# Patient Record
Sex: MALE | Race: WHITE | NOT HISPANIC OR LATINO
[De-identification: names, ages, dates, MRNs, and addresses within clinical notes are randomized per-mention and may not be internally consistent; named-entity substitution may affect disease eponyms.]

---

## 2017-04-18 ENCOUNTER — APPOINTMENT (OUTPATIENT)
Dept: DERMATOLOGY | Facility: CLINIC | Age: 62
End: 2017-04-18

## 2017-04-18 DIAGNOSIS — Z86.69 PERSONAL HISTORY OF OTHER DISEASES OF THE NERVOUS SYSTEM AND SENSE ORGANS: ICD-10-CM

## 2017-11-15 ENCOUNTER — APPOINTMENT (OUTPATIENT)
Dept: DERMATOLOGY | Facility: CLINIC | Age: 62
End: 2017-11-15
Payer: COMMERCIAL

## 2017-11-15 VITALS — WEIGHT: 200 LBS | HEIGHT: 72 IN | BODY MASS INDEX: 27.09 KG/M2

## 2017-11-15 DIAGNOSIS — L60.8 OTHER NAIL DISORDERS: ICD-10-CM

## 2017-11-15 PROCEDURE — 99213 OFFICE O/P EST LOW 20 MIN: CPT

## 2018-02-15 ENCOUNTER — APPOINTMENT (OUTPATIENT)
Dept: INTERNAL MEDICINE | Facility: CLINIC | Age: 63
End: 2018-02-15
Payer: COMMERCIAL

## 2018-02-15 VITALS
HEIGHT: 72 IN | TEMPERATURE: 97.5 F | BODY MASS INDEX: 28.58 KG/M2 | DIASTOLIC BLOOD PRESSURE: 70 MMHG | RESPIRATION RATE: 16 BRPM | OXYGEN SATURATION: 97 % | WEIGHT: 211 LBS | HEART RATE: 83 BPM | SYSTOLIC BLOOD PRESSURE: 134 MMHG

## 2018-02-15 DIAGNOSIS — H40.9 UNSPECIFIED GLAUCOMA: ICD-10-CM

## 2018-02-15 DIAGNOSIS — E78.1 PURE HYPERGLYCERIDEMIA: ICD-10-CM

## 2018-02-15 DIAGNOSIS — F41.9 ANXIETY DISORDER, UNSPECIFIED: ICD-10-CM

## 2018-02-15 DIAGNOSIS — E78.00 PURE HYPERCHOLESTEROLEMIA, UNSPECIFIED: ICD-10-CM

## 2018-02-15 PROCEDURE — ZZZZZ: CPT

## 2018-02-15 PROCEDURE — 94060 EVALUATION OF WHEEZING: CPT

## 2018-02-15 PROCEDURE — 94727 GAS DIL/WSHOT DETER LNG VOL: CPT

## 2018-02-15 PROCEDURE — 94729 DIFFUSING CAPACITY: CPT

## 2018-02-15 PROCEDURE — 94664 DEMO&/EVAL PT USE INHALER: CPT | Mod: 59

## 2018-02-15 PROCEDURE — 99245 OFF/OP CONSLTJ NEW/EST HI 55: CPT | Mod: 25

## 2018-02-15 RX ORDER — PILOCARPINE HYDROCHLORIDE OPHTHALMIC SOLUTION 20 MG/ML
2 SOLUTION/ DROPS OPHTHALMIC
Qty: 15 | Refills: 0 | Status: ACTIVE | COMMUNITY
Start: 2017-06-08

## 2018-02-15 RX ORDER — BRIMONIDINE TARTRATE, TIMOLOL MALEATE 2; 5 MG/ML; MG/ML
0.2-0.5 SOLUTION/ DROPS OPHTHALMIC
Qty: 10 | Refills: 0 | Status: ACTIVE | COMMUNITY
Start: 2017-07-07

## 2018-02-15 RX ORDER — FENOFIBRATE 54 MG/1
54 TABLET ORAL DAILY
Refills: 0 | Status: ACTIVE | COMMUNITY
Start: 2017-07-08

## 2018-02-15 RX ORDER — BRINZOLAMIDE 10 MG/ML
1 SUSPENSION/ DROPS OPHTHALMIC
Qty: 10 | Refills: 0 | Status: ACTIVE | COMMUNITY
Start: 2017-06-07

## 2018-02-15 RX ORDER — ROSUVASTATIN CALCIUM 5 MG/1
5 TABLET, FILM COATED ORAL DAILY
Refills: 0 | Status: ACTIVE | COMMUNITY

## 2018-02-15 RX ORDER — NEOMYCIN SULFATE, POLYMYXIN B SULFATE AND DEXAMETHASONE 3.5; 10000; 1 MG/ML; [USP'U]/ML; MG/ML
3.5-10000-0.1 SUSPENSION OPHTHALMIC
Qty: 5 | Refills: 0 | Status: ACTIVE | COMMUNITY
Start: 2017-06-07

## 2018-02-15 RX ORDER — LATANOPROST/PF 0.005 %
0.01 DROPS OPHTHALMIC (EYE)
Qty: 2 | Refills: 0 | Status: ACTIVE | COMMUNITY
Start: 2017-07-03

## 2018-03-29 ENCOUNTER — APPOINTMENT (OUTPATIENT)
Dept: DERMATOLOGY | Facility: CLINIC | Age: 63
End: 2018-03-29
Payer: COMMERCIAL

## 2018-03-29 ENCOUNTER — APPOINTMENT (OUTPATIENT)
Dept: INTERNAL MEDICINE | Facility: CLINIC | Age: 63
End: 2018-03-29
Payer: COMMERCIAL

## 2018-03-29 DIAGNOSIS — Z80.1 FAMILY HISTORY OF MALIGNANT NEOPLASM OF TRACHEA, BRONCHUS AND LUNG: ICD-10-CM

## 2018-03-29 DIAGNOSIS — Z82.5 FAMILY HISTORY OF ASTHMA AND OTHER CHRONIC LOWER RESPIRATORY DISEASES: ICD-10-CM

## 2018-03-29 DIAGNOSIS — Z78.9 OTHER SPECIFIED HEALTH STATUS: ICD-10-CM

## 2018-03-29 DIAGNOSIS — Z82.3 FAMILY HISTORY OF STROKE: ICD-10-CM

## 2018-03-29 DIAGNOSIS — D48.5 NEOPLASM OF UNCERTAIN BEHAVIOR OF SKIN: ICD-10-CM

## 2018-03-29 PROCEDURE — 11100 BX SKIN SUBCUTANEOUS&/MUCOUS MEMBRANE 1 LESION: CPT

## 2018-03-29 PROCEDURE — 99213 OFFICE O/P EST LOW 20 MIN: CPT | Mod: 25

## 2018-03-29 RX ORDER — PREDNISONE 20 MG/1
20 TABLET ORAL
Qty: 20 | Refills: 0 | Status: COMPLETED | COMMUNITY
Start: 2018-02-15 | End: 2018-03-29

## 2018-03-30 ENCOUNTER — APPOINTMENT (OUTPATIENT)
Dept: INTERNAL MEDICINE | Facility: CLINIC | Age: 63
End: 2018-03-30
Payer: COMMERCIAL

## 2018-03-30 ENCOUNTER — NON-APPOINTMENT (OUTPATIENT)
Age: 63
End: 2018-03-30

## 2018-03-30 VITALS
RESPIRATION RATE: 18 BRPM | BODY MASS INDEX: 27.9 KG/M2 | HEIGHT: 72 IN | OXYGEN SATURATION: 96 % | SYSTOLIC BLOOD PRESSURE: 96 MMHG | WEIGHT: 206 LBS | DIASTOLIC BLOOD PRESSURE: 60 MMHG | HEART RATE: 70 BPM | TEMPERATURE: 98.3 F

## 2018-03-30 DIAGNOSIS — K21.9 GASTRO-ESOPHAGEAL REFLUX DISEASE W/OUT ESOPHAGITIS: ICD-10-CM

## 2018-03-30 PROCEDURE — 99214 OFFICE O/P EST MOD 30 MIN: CPT | Mod: 25

## 2018-03-30 PROCEDURE — 94060 EVALUATION OF WHEEZING: CPT

## 2018-03-30 RX ORDER — LOTEPREDNOL ETABONATE 5 MG/G
0.5 GEL OPHTHALMIC
Qty: 5 | Refills: 0 | Status: COMPLETED | COMMUNITY
Start: 2017-06-30 | End: 2018-03-30

## 2018-04-09 LAB — CORE LAB BIOPSY: NORMAL

## 2018-04-11 ENCOUNTER — APPOINTMENT (OUTPATIENT)
Dept: DERMATOLOGY | Facility: CLINIC | Age: 63
End: 2018-04-11
Payer: COMMERCIAL

## 2018-04-11 DIAGNOSIS — L85.3 XEROSIS CUTIS: ICD-10-CM

## 2018-04-11 PROCEDURE — 99213 OFFICE O/P EST LOW 20 MIN: CPT

## 2018-09-04 ENCOUNTER — APPOINTMENT (OUTPATIENT)
Dept: INTERNAL MEDICINE | Facility: CLINIC | Age: 63
End: 2018-09-04
Payer: COMMERCIAL

## 2018-09-04 ENCOUNTER — NON-APPOINTMENT (OUTPATIENT)
Age: 63
End: 2018-09-04

## 2018-09-04 VITALS
DIASTOLIC BLOOD PRESSURE: 68 MMHG | HEART RATE: 67 BPM | OXYGEN SATURATION: 97 % | SYSTOLIC BLOOD PRESSURE: 110 MMHG | TEMPERATURE: 98 F | RESPIRATION RATE: 18 BRPM | WEIGHT: 196 LBS | HEIGHT: 72 IN | BODY MASS INDEX: 26.55 KG/M2

## 2018-09-04 PROCEDURE — 99214 OFFICE O/P EST MOD 30 MIN: CPT | Mod: 25

## 2018-09-04 PROCEDURE — 94060 EVALUATION OF WHEEZING: CPT

## 2018-09-04 NOTE — PLAN
[FreeTextEntry1] : 1. Continue with medication as outlined above.\par \par 2. Flu shot to be obtained in the fall.\par \par 3. Routine medical followup with his primary care physician, Dr. boxer. Pneumonia vaccines should be updated, including Prevnar, and Pneumovax 23, given his age of 63.\par \par 4. The patient has been instructed to contact this office immediately at the earliest sign of any upper respiratory infection or sinus infection in the near future.\par \par 5. Followup in 6 months with full pulmonary function testing.

## 2018-09-04 NOTE — PHYSICAL EXAM
[General Appearance - Alert] : alert [General Appearance - In No Acute Distress] : in no acute distress [General Appearance - Well Developed] : well developed [General Appearance - Well-Appearing] : healthy appearing [Outer Ear] : the ears and nose were normal in appearance [Hearing Threshold Finger Rub Not [place]] : hearing was normal [Examination Of The Oral Cavity] : the lips and gums were normal [Nasal Cavity] : the nasal mucosa and septum were normal [Oropharynx] : the oropharynx was normal [Neck Appearance] : the appearance of the neck was normal [Neck Cervical Mass (___cm)] : no neck mass was observed [Jugular Venous Distention Increased] : there was no jugular-venous distention [Thyroid Diffuse Enlargement] : the thyroid was not enlarged [] : no respiratory distress [Respiration, Rhythm And Depth] : normal respiratory rhythm and effort [Exaggerated Use Of Accessory Muscles For Inspiration] : no accessory muscle use [Auscultation Breath Sounds / Voice Sounds] : lungs were clear to auscultation bilaterally [Heart Rate And Rhythm] : heart rate was normal and rhythm regular [Heart Sounds] : normal S1 and S2 [Heart Sounds Gallop] : no gallops [Heart Sounds Pericardial Friction Rub] : no pericardial rub [Edema] : there was no peripheral edema [Veins - Varicosity Changes] : there were no varicosital changes [Abdomen Soft] : soft [FreeTextEntry1] : obese [Cervical Lymph Nodes Enlarged Anterior Bilaterally] : anterior cervical [Supraclavicular Lymph Nodes Enlarged Bilaterally] : supraclavicular [Abnormal Walk] : normal gait [Nail Clubbing] : no clubbing  or cyanosis of the fingernails [Musculoskeletal - Swelling] : no joint swelling seen

## 2018-09-04 NOTE — HISTORY OF PRESENT ILLNESS
[de-identified] : The patient comes in today for evaluation, and reassessment of his pulmonary status.\par \par Overall, from a pulmonary standpoint, the patient continues to do extremely well. He remains compliant with his Advair, taking it twice a day. He has not had any exacerbations of his underlying airway reactivity. In fact, the patient has not required the use of his Proventil either via a meter dose inhaler, or with the nebulizer. He denies any sputum production.\par \par With regard to his underlying mild seasonal allergies, he states that he had only minimal symptomatology. He denies any watery eyes or runny nose at this time. He denies any reflux issues.\par \par The patient states that he has been monitoring his carbohydrate intake. He has lost 15 pounds since the last visit. He is shooting for a target weight of approximately 180 pounds. He states that overall he feels much better. He has less breathlessness. He does not perform any formal type of exercise, but he does remain quite active. He now comes in for this assessment.

## 2018-09-04 NOTE — DATA REVIEWED
[FreeTextEntry1] : Spirometric analysis is within normal limits. Mild bronchodilator reactivity is demonstrated.

## 2018-09-27 ENCOUNTER — APPOINTMENT (OUTPATIENT)
Dept: INTERNAL MEDICINE | Facility: CLINIC | Age: 63
End: 2018-09-27

## 2019-03-19 ENCOUNTER — APPOINTMENT (OUTPATIENT)
Dept: INTERNAL MEDICINE | Facility: CLINIC | Age: 64
End: 2019-03-19
Payer: COMMERCIAL

## 2019-03-19 VITALS
HEART RATE: 74 BPM | DIASTOLIC BLOOD PRESSURE: 64 MMHG | OXYGEN SATURATION: 98 % | RESPIRATION RATE: 18 BRPM | BODY MASS INDEX: 25.06 KG/M2 | HEIGHT: 72 IN | TEMPERATURE: 97.9 F | WEIGHT: 185 LBS | SYSTOLIC BLOOD PRESSURE: 110 MMHG

## 2019-03-19 PROCEDURE — 99215 OFFICE O/P EST HI 40 MIN: CPT | Mod: 25

## 2019-03-19 PROCEDURE — 94727 GAS DIL/WSHOT DETER LNG VOL: CPT

## 2019-03-19 PROCEDURE — 94729 DIFFUSING CAPACITY: CPT

## 2019-03-19 PROCEDURE — 94060 EVALUATION OF WHEEZING: CPT

## 2019-03-19 PROCEDURE — ZZZZZ: CPT

## 2019-03-19 NOTE — HISTORY OF PRESENT ILLNESS
[de-identified] : Patient comes in today for a followup evaluation, and yearly reassessment of his pulmonary status.\par \par Overall, from a pulmonary standpoint, the patient states that he has been fairly stable. He remains compliant with his Advair, using it on a daily basis. He notes that since first coming to the office over a year ago, he is dramatically improved. He has less dyspnea with exertional type activities. He denies any cough or sputum production. He does have chronic throat clearing, which she attributes to postnasal drip.\par \par The patient does not perform any formal type of exercise. He notes that he has not required the use of his rescue inhaler. He remains quite active. He denies having any nocturnal symptoms. He has not lost any time at work. He denies any purulent sputum production or purulent nasal secretions. He still did not find out about the results of his pneumonia vaccine status. He now comes in for this assessment.

## 2019-03-19 NOTE — PHYSICAL EXAM
[No CVA Tenderness] : no CVA  tenderness [No Spinal Tenderness] : no spinal tenderness [No Rash] : no rash [No Joint Swelling] : no joint swelling [Normal Gait] : normal gait [Coordination Grossly Intact] : coordination grossly intact [Normal Insight/Judgement] : insight and judgment were intact [Normal Affect] : the affect was normal [General Appearance - Alert] : alert [General Appearance - In No Acute Distress] : in no acute distress [General Appearance - Well Developed] : well developed [General Appearance - Well-Appearing] : healthy appearing [Outer Ear] : the ears and nose were normal in appearance [Hearing Threshold Finger Rub Not [place]] : hearing was normal [Examination Of The Oral Cavity] : the lips and gums were normal [Nasal Cavity] : the nasal mucosa and septum were normal [Oropharynx] : the oropharynx was normal [Neck Appearance] : the appearance of the neck was normal [Neck Cervical Mass (___cm)] : no neck mass was observed [Jugular Venous Distention Increased] : there was no jugular-venous distention [] : no respiratory distress [Thyroid Diffuse Enlargement] : the thyroid was not enlarged [Respiration, Rhythm And Depth] : normal respiratory rhythm and effort [Auscultation Breath Sounds / Voice Sounds] : lungs were clear to auscultation bilaterally [Exaggerated Use Of Accessory Muscles For Inspiration] : no accessory muscle use [Heart Sounds] : normal S1 and S2 [Heart Rate And Rhythm] : heart rate was normal and rhythm regular [Heart Sounds Gallop] : no gallops [Heart Sounds Pericardial Friction Rub] : no pericardial rub [Edema] : there was no peripheral edema [Veins - Varicosity Changes] : there were no varicosital changes [FreeTextEntry1] : obese [Abdomen Soft] : soft [Cervical Lymph Nodes Enlarged Anterior Bilaterally] : anterior cervical [Supraclavicular Lymph Nodes Enlarged Bilaterally] : supraclavicular [Abnormal Walk] : normal gait [Nail Clubbing] : no clubbing  or cyanosis of the fingernails [Musculoskeletal - Swelling] : no joint swelling seen

## 2019-03-19 NOTE — PLAN
[FreeTextEntry1] : 1. Continue with regimen as outlined above.\par \par 2. The patient will need Pneumovax 23 in the fall of this year.\par \par 3. A formal cardiovascular exercise regimen has been recommended.\par \par 4. Followup in 6 months with pre-post spirometry and O2 saturation. The patient will consider using this office for all primary care moving forward.

## 2019-03-19 NOTE — DATA REVIEWED
[FreeTextEntry1] : A pulmonary function test is performed. Lung volumes are within normal limits. Lung mechanics are within normal limits. Bronchodilator reactivity is demonstrated. The DLCO and saturation maintained. This represents normal physiologic function.  Airway reactivity is not demonstrated at this time.

## 2019-04-08 ENCOUNTER — RX RENEWAL (OUTPATIENT)
Age: 64
End: 2019-04-08

## 2019-04-08 DIAGNOSIS — B35.6 TINEA CRURIS: ICD-10-CM

## 2019-04-23 ENCOUNTER — APPOINTMENT (OUTPATIENT)
Dept: DERMATOLOGY | Facility: CLINIC | Age: 64
End: 2019-04-23
Payer: COMMERCIAL

## 2019-04-23 DIAGNOSIS — L73.8 OTHER SPECIFIED FOLLICULAR DISORDERS: ICD-10-CM

## 2019-04-23 PROCEDURE — 99213 OFFICE O/P EST LOW 20 MIN: CPT

## 2019-04-23 NOTE — ASSESSMENT
[FreeTextEntry1] : A complete skin examination was performed.  There is no evidence of skin cancer.  We discussed the importance of photoprotection, including the use of hats, protective clothing and sunscreens with an SPF of at least 30.  Sun avoidance was also discussed.\par \par Seb hyperplasia - left nose and forehead.\par Benign.

## 2019-04-23 NOTE — PHYSICAL EXAM
[Alert] : alert [Oriented x 3] : ~L oriented x 3 [Well Nourished] : well nourished [Full Body Skin Exam Performed] : performed [Eyelids] : Eyelids [Ears] : Ears [Lips] : Lips [Neck] : Neck [FreeTextEntry3] : A full skin exam was performed including the scalp, face, neck, chest, abdomen, back, buttocks, upper extremities and lower extremities.  The patient declined examination of the genitalia.  \par The exam did not reveal any evidence of skin cancer, showing only the following benign growths:\par [place] pigmented nevi.\par Lentigines.\par Cherry angioma.\par Yellowish umbilicated papules - left nose and forehead.

## 2019-04-23 NOTE — HISTORY OF PRESENT ILLNESS
[FreeTextEntry1] : Patient presents for skin examination. [de-identified] : Denies new, changing, bleeding or tender lesions on the skin over the past year.\par

## 2019-10-22 ENCOUNTER — NON-APPOINTMENT (OUTPATIENT)
Age: 64
End: 2019-10-22

## 2019-10-22 ENCOUNTER — APPOINTMENT (OUTPATIENT)
Dept: INTERNAL MEDICINE | Facility: CLINIC | Age: 64
End: 2019-10-22
Payer: COMMERCIAL

## 2019-10-22 VITALS
HEIGHT: 72 IN | SYSTOLIC BLOOD PRESSURE: 124 MMHG | BODY MASS INDEX: 26.01 KG/M2 | OXYGEN SATURATION: 97 % | DIASTOLIC BLOOD PRESSURE: 68 MMHG | HEART RATE: 71 BPM | RESPIRATION RATE: 18 BRPM | WEIGHT: 192 LBS | TEMPERATURE: 97.8 F

## 2019-10-22 PROCEDURE — 94060 EVALUATION OF WHEEZING: CPT

## 2019-10-22 PROCEDURE — 99214 OFFICE O/P EST MOD 30 MIN: CPT | Mod: 25

## 2019-10-22 NOTE — PHYSICAL EXAM
[General Appearance - Alert] : alert [General Appearance - In No Acute Distress] : in no acute distress [General Appearance - Well Developed] : well developed [General Appearance - Well-Appearing] : healthy appearing [Outer Ear] : the ears and nose were normal in appearance [Hearing Threshold Finger Rub Not [place]] : hearing was normal [Examination Of The Oral Cavity] : the lips and gums were normal [Nasal Cavity] : the nasal mucosa and septum were normal [Oropharynx] : the oropharynx was normal [Neck Appearance] : the appearance of the neck was normal [Neck Cervical Mass (___cm)] : no neck mass was observed [Jugular Venous Distention Increased] : there was no jugular-venous distention [Thyroid Diffuse Enlargement] : the thyroid was not enlarged [] : no respiratory distress [Respiration, Rhythm And Depth] : normal respiratory rhythm and effort [Exaggerated Use Of Accessory Muscles For Inspiration] : no accessory muscle use [Auscultation Breath Sounds / Voice Sounds] : lungs were clear to auscultation bilaterally [Heart Rate And Rhythm] : heart rate was normal and rhythm regular [Heart Sounds] : normal S1 and S2 [Heart Sounds Gallop] : no gallops [Heart Sounds Pericardial Friction Rub] : no pericardial rub [Edema] : there was no peripheral edema [Veins - Varicosity Changes] : there were no varicosital changes [Abdomen Soft] : soft [Cervical Lymph Nodes Enlarged Anterior Bilaterally] : anterior cervical [Abnormal Walk] : normal gait [Supraclavicular Lymph Nodes Enlarged Bilaterally] : supraclavicular [Nail Clubbing] : no clubbing  or cyanosis of the fingernails [Musculoskeletal - Swelling] : no joint swelling seen [FreeTextEntry1] : obese

## 2019-10-22 NOTE — DATA REVIEWED
[FreeTextEntry1] : Spirometric analysis is within normal limits. Minimal bronchodilator reactivity is demonstrated.

## 2019-10-22 NOTE — ADDENDUM
[FreeTextEntry1] : I, Maksim Christie, acted solely as a scribe for Dr. Se Garcia on this date 10/22/2019.

## 2019-10-22 NOTE — HISTORY OF PRESENT ILLNESS
[de-identified] : This patient comes in today for a follow up evaluation of his pulmonary status.\par \par Overall, from a pulmonary standpoint, he is doing relatively well. He suffers from mild persistent asthma, but has no exacerbations to report. He states that he did experience slight wheezing this morning. Additionally, he reports coughing and postnasal drip.However, he has not used a nasal spray to relieve his symptoms. He denies phlegm, mucous, and nocturnal symptoms. He has been compliant with his Advair medication for management. He remains active at home through daily chores, but does not follow an exercise routine. He has used his rescue inhaler once in the past 6 months. He is up to date on his PNA vaccines, and has a history of smoking. He smoked less than a pack of cigarettes per day during college, but has not smoked for the past 20 years. \par \par He experiences allergic symptoms in the fall. His symptoms consist of nasal congestion and throat clearing.\par \par Today he comes in for this assessment.

## 2019-10-22 NOTE — END OF VISIT
[FreeTextEntry3] : All medical record entries made by the scribe were at my, Dr. Se Garcia, direction and personally dictated by me on 10/22/2019. I have reviewed the chart and agree that the record accurately reflects my personal performance of the history, physical exam, assessment and plan. I have also personally directed, reviewed, and agreed with the chart.

## 2019-10-22 NOTE — PLAN
[FreeTextEntry1] : 1. Continue with medication as outlined above.\par \par 2. Begin 0.1% astepro nasal spray, one spray in the right nostril and two sprays in the left nostril b.i.d.\par \par 3. Cardiovascular exercise regimen was recommended.\par \par 4. Follow up with myself in 6 months for a PFT.\par \par 5. Routine medical followup with his primary care physician, Dr. Boxer

## 2020-04-27 ENCOUNTER — APPOINTMENT (OUTPATIENT)
Dept: INTERNAL MEDICINE | Facility: CLINIC | Age: 65
End: 2020-04-27
Payer: COMMERCIAL

## 2020-04-27 PROCEDURE — 99441: CPT

## 2020-04-27 NOTE — HISTORY OF PRESENT ILLNESS
[Verbal consent obtained from patient] : the patient, [unfilled] [de-identified] : A telephone visit was conducted today.\par \par The patient states, that overall, he has been relatively stable from a pulmonary standpoint. He does still complain, of postnasal drip. The drip appears to be more pronounced upon lying down in bed at night. He has been compliant with the Astelin nasal spray, with fair to good results. He is in the midst of his current seasonal allergies at this time. He denies any purulent nasal secretions.\par \par With regards to his underlying asthma, he states that it has been fairly well controlled. He continues with his Advair on a regular basis. He has not required the use of his albuterol for rescue.\par \par The patient continues to be followed by his primary care physician, Dr. boxer, for all other medical issues.

## 2020-04-27 NOTE — PLAN
[FreeTextEntry1] : 1. Continue with medication as outlined above.\par \par 2. The patient will contact me immediately, if his above noted symptoms worsen, or if he develops purulent sputum.\par \par 3. Followup in the near future with full pulmonary function studies.\par \par total length of time of this call was 8 minutes.

## 2020-04-28 ENCOUNTER — APPOINTMENT (OUTPATIENT)
Dept: INTERNAL MEDICINE | Facility: CLINIC | Age: 65
End: 2020-04-28

## 2020-04-30 ENCOUNTER — APPOINTMENT (OUTPATIENT)
Dept: DERMATOLOGY | Facility: CLINIC | Age: 65
End: 2020-04-30

## 2020-10-27 ENCOUNTER — APPOINTMENT (OUTPATIENT)
Dept: INTERNAL MEDICINE | Facility: CLINIC | Age: 65
End: 2020-10-27
Payer: COMMERCIAL

## 2020-10-27 VITALS
OXYGEN SATURATION: 95 % | HEART RATE: 82 BPM | TEMPERATURE: 98.3 F | RESPIRATION RATE: 18 BRPM | HEIGHT: 72 IN | SYSTOLIC BLOOD PRESSURE: 106 MMHG | WEIGHT: 196 LBS | BODY MASS INDEX: 26.55 KG/M2 | DIASTOLIC BLOOD PRESSURE: 68 MMHG

## 2020-10-27 PROCEDURE — 99214 OFFICE O/P EST MOD 30 MIN: CPT | Mod: 25

## 2020-10-27 PROCEDURE — 99072 ADDL SUPL MATRL&STAF TM PHE: CPT

## 2020-10-27 NOTE — HISTORY OF PRESENT ILLNESS
[de-identified] : The patient comes in today for a followup evaluation, and reassessment of his pulmonary status.\par \par At this time, the patient states that he is relatively stable from a pulmonary standpoint. He has only been using his Advair one puff daily. He rarely requires the use of his albuterol. He has not had any wheezing. Abdomen exacerbations of his underlying asthma. He notes that occasionally, he does cough, and he attributes this primarily to postnasal drip.\par \par The patient does have mild seasonal allergies. He had been fairly stable at this time. He refuses to use any nasal sprays to help with the worsening postnasal drip after allergy exposures. He used Astelin nasal spray in the past. He does not perform any formal type of exercise, but he does remain active. He comes in for this assessment\par \par

## 2020-10-27 NOTE — PLAN
[FreeTextEntry1] : 1. Continue with medication as outlined above.\par \par 2. The patient has been instructed that he needs to increase his Advair to one puff b.i.d. for maintenance. This is very important at this time, as we noted during the winter viral season.\par \par 3. Patient will obtain the flu shot at his primary care physician's office in the next 2-3 weeks.\par \par 4. Followup in 6 months with full pulmonary function testing.

## 2020-10-27 NOTE — PHYSICAL EXAM
[General Appearance - Alert] : alert [General Appearance - In No Acute Distress] : in no acute distress [General Appearance - Well Developed] : well developed [General Appearance - Well-Appearing] : healthy appearing [Outer Ear] : the ears and nose were normal in appearance [Hearing Threshold Finger Rub Not [place]] : hearing was normal [Examination Of The Oral Cavity] : the lips and gums were normal [Nasal Cavity] : the nasal mucosa and septum were normal [Oropharynx] : the oropharynx was normal [Neck Appearance] : the appearance of the neck was normal [Neck Cervical Mass (___cm)] : no neck mass was observed [Jugular Venous Distention Increased] : there was no jugular-venous distention [Thyroid Diffuse Enlargement] : the thyroid was not enlarged [] : no respiratory distress [Respiration, Rhythm And Depth] : normal respiratory rhythm and effort [Exaggerated Use Of Accessory Muscles For Inspiration] : no accessory muscle use [Auscultation Breath Sounds / Voice Sounds] : lungs were clear to auscultation bilaterally [Heart Rate And Rhythm] : heart rate was normal and rhythm regular [Heart Sounds] : normal S1 and S2 [Heart Sounds Gallop] : no gallops [Heart Sounds Pericardial Friction Rub] : no pericardial rub [Edema] : there was no peripheral edema [Veins - Varicosity Changes] : there were no varicosital changes [Abdomen Soft] : soft [FreeTextEntry1] : obese [Cervical Lymph Nodes Enlarged Anterior Bilaterally] : anterior cervical [Supraclavicular Lymph Nodes Enlarged Bilaterally] : supraclavicular [Abnormal Walk] : normal gait [Nail Clubbing] : no clubbing  or cyanosis of the fingernails [Musculoskeletal - Swelling] : no joint swelling seen

## 2021-07-31 ENCOUNTER — INPATIENT (INPATIENT)
Facility: HOSPITAL | Age: 66
LOS: 0 days | Discharge: HOME | DRG: 916 | End: 2021-08-01
Attending: INTERNAL MEDICINE | Admitting: INTERNAL MEDICINE
Payer: MEDICARE

## 2021-07-31 VITALS — HEIGHT: 72 IN | WEIGHT: 199.96 LBS

## 2021-07-31 DIAGNOSIS — T78.2XXA ANAPHYLACTIC SHOCK, UNSPECIFIED, INITIAL ENCOUNTER: ICD-10-CM

## 2021-07-31 LAB
ALBUMIN SERPL ELPH-MCNC: 4.2 G/DL — SIGNIFICANT CHANGE UP (ref 3.3–5)
ALP SERPL-CCNC: 36 U/L — LOW (ref 40–120)
ALT FLD-CCNC: 37 U/L — SIGNIFICANT CHANGE UP (ref 12–78)
ANION GAP SERPL CALC-SCNC: 8 MMOL/L — SIGNIFICANT CHANGE UP (ref 5–17)
AST SERPL-CCNC: 30 U/L — SIGNIFICANT CHANGE UP (ref 15–37)
BASOPHILS # BLD AUTO: 0.03 K/UL — SIGNIFICANT CHANGE UP (ref 0–0.2)
BASOPHILS NFR BLD AUTO: 0.3 % — SIGNIFICANT CHANGE UP (ref 0–2)
BILIRUB SERPL-MCNC: 0.7 MG/DL — SIGNIFICANT CHANGE UP (ref 0.2–1.2)
BUN SERPL-MCNC: 20 MG/DL — SIGNIFICANT CHANGE UP (ref 7–23)
CALCIUM SERPL-MCNC: 9.2 MG/DL — SIGNIFICANT CHANGE UP (ref 8.5–10.1)
CHLORIDE SERPL-SCNC: 107 MMOL/L — SIGNIFICANT CHANGE UP (ref 96–108)
CO2 SERPL-SCNC: 23 MMOL/L — SIGNIFICANT CHANGE UP (ref 22–31)
CREAT SERPL-MCNC: 1.29 MG/DL — SIGNIFICANT CHANGE UP (ref 0.5–1.3)
EOSINOPHIL # BLD AUTO: 0.08 K/UL — SIGNIFICANT CHANGE UP (ref 0–0.5)
EOSINOPHIL NFR BLD AUTO: 0.9 % — SIGNIFICANT CHANGE UP (ref 0–6)
GLUCOSE SERPL-MCNC: 117 MG/DL — HIGH (ref 70–99)
HCT VFR BLD CALC: 43.9 % — SIGNIFICANT CHANGE UP (ref 39–50)
HGB BLD-MCNC: 15.1 G/DL — SIGNIFICANT CHANGE UP (ref 13–17)
IMM GRANULOCYTES NFR BLD AUTO: 0.8 % — SIGNIFICANT CHANGE UP (ref 0–1.5)
LYMPHOCYTES # BLD AUTO: 2.4 K/UL — SIGNIFICANT CHANGE UP (ref 1–3.3)
LYMPHOCYTES # BLD AUTO: 26.2 % — SIGNIFICANT CHANGE UP (ref 13–44)
MAGNESIUM SERPL-MCNC: 2.2 MG/DL — SIGNIFICANT CHANGE UP (ref 1.6–2.6)
MCHC RBC-ENTMCNC: 29.7 PG — SIGNIFICANT CHANGE UP (ref 27–34)
MCHC RBC-ENTMCNC: 34.4 GM/DL — SIGNIFICANT CHANGE UP (ref 32–36)
MCV RBC AUTO: 86.2 FL — SIGNIFICANT CHANGE UP (ref 80–100)
MONOCYTES # BLD AUTO: 0.54 K/UL — SIGNIFICANT CHANGE UP (ref 0–0.9)
MONOCYTES NFR BLD AUTO: 5.9 % — SIGNIFICANT CHANGE UP (ref 2–14)
NEUTROPHILS # BLD AUTO: 6.05 K/UL — SIGNIFICANT CHANGE UP (ref 1.8–7.4)
NEUTROPHILS NFR BLD AUTO: 65.9 % — SIGNIFICANT CHANGE UP (ref 43–77)
NT-PROBNP SERPL-SCNC: 99 PG/ML — SIGNIFICANT CHANGE UP (ref 0–125)
PLATELET # BLD AUTO: 297 K/UL — SIGNIFICANT CHANGE UP (ref 150–400)
POTASSIUM SERPL-MCNC: 3.6 MMOL/L — SIGNIFICANT CHANGE UP (ref 3.5–5.3)
POTASSIUM SERPL-SCNC: 3.6 MMOL/L — SIGNIFICANT CHANGE UP (ref 3.5–5.3)
PROT SERPL-MCNC: 7.6 GM/DL — SIGNIFICANT CHANGE UP (ref 6–8.3)
RBC # BLD: 5.09 M/UL — SIGNIFICANT CHANGE UP (ref 4.2–5.8)
RBC # FLD: 12.3 % — SIGNIFICANT CHANGE UP (ref 10.3–14.5)
SARS-COV-2 RNA SPEC QL NAA+PROBE: SIGNIFICANT CHANGE UP
SODIUM SERPL-SCNC: 138 MMOL/L — SIGNIFICANT CHANGE UP (ref 135–145)
TROPONIN I SERPL-MCNC: <0.015 NG/ML — SIGNIFICANT CHANGE UP (ref 0.01–0.04)
WBC # BLD: 9.17 K/UL — SIGNIFICANT CHANGE UP (ref 3.8–10.5)
WBC # FLD AUTO: 9.17 K/UL — SIGNIFICANT CHANGE UP (ref 3.8–10.5)

## 2021-07-31 PROCEDURE — 71045 X-RAY EXAM CHEST 1 VIEW: CPT | Mod: 26

## 2021-07-31 PROCEDURE — 93010 ELECTROCARDIOGRAM REPORT: CPT

## 2021-07-31 PROCEDURE — 99291 CRITICAL CARE FIRST HOUR: CPT

## 2021-07-31 RX ORDER — FAMOTIDINE 10 MG/ML
20 INJECTION INTRAVENOUS ONCE
Refills: 0 | Status: COMPLETED | OUTPATIENT
Start: 2021-07-31 | End: 2021-07-31

## 2021-07-31 RX ORDER — FAMOTIDINE 10 MG/ML
20 INJECTION INTRAVENOUS EVERY 12 HOURS
Refills: 0 | Status: COMPLETED | OUTPATIENT
Start: 2021-07-31 | End: 2021-08-01

## 2021-07-31 RX ORDER — DEXAMETHASONE 0.5 MG/5ML
6 ELIXIR ORAL ONCE
Refills: 0 | Status: COMPLETED | OUTPATIENT
Start: 2021-07-31 | End: 2021-07-31

## 2021-07-31 RX ORDER — EPINEPHRINE 0.3 MG/.3ML
0.3 INJECTION INTRAMUSCULAR; SUBCUTANEOUS ONCE
Refills: 0 | Status: COMPLETED | OUTPATIENT
Start: 2021-07-31 | End: 2021-07-31

## 2021-07-31 RX ORDER — ONDANSETRON 8 MG/1
4 TABLET, FILM COATED ORAL ONCE
Refills: 0 | Status: COMPLETED | OUTPATIENT
Start: 2021-07-31 | End: 2021-07-31

## 2021-07-31 RX ORDER — SODIUM CHLORIDE 9 MG/ML
1000 INJECTION INTRAMUSCULAR; INTRAVENOUS; SUBCUTANEOUS
Refills: 0 | Status: DISCONTINUED | OUTPATIENT
Start: 2021-07-31 | End: 2021-08-01

## 2021-07-31 RX ORDER — EPINEPHRINE 0.3 MG/.3ML
0.3 INJECTION INTRAMUSCULAR; SUBCUTANEOUS ONCE
Refills: 0 | Status: DISCONTINUED | OUTPATIENT
Start: 2021-07-31 | End: 2021-08-01

## 2021-07-31 RX ORDER — DORZOLAMIDE HYDROCHLORIDE 20 MG/ML
1 SOLUTION/ DROPS OPHTHALMIC
Refills: 0 | Status: DISCONTINUED | OUTPATIENT
Start: 2021-07-31 | End: 2021-08-01

## 2021-07-31 RX ORDER — CHLORHEXIDINE GLUCONATE 213 G/1000ML
1 SOLUTION TOPICAL
Refills: 0 | Status: DISCONTINUED | OUTPATIENT
Start: 2021-07-31 | End: 2021-08-01

## 2021-07-31 RX ORDER — EPINEPHRINE 0.3 MG/.3ML
0.01 INJECTION INTRAMUSCULAR; SUBCUTANEOUS
Qty: 4 | Refills: 0 | Status: DISCONTINUED | OUTPATIENT
Start: 2021-07-31 | End: 2021-07-31

## 2021-07-31 RX ORDER — FENOFIBRATE,MICRONIZED 130 MG
145 CAPSULE ORAL DAILY
Refills: 0 | Status: DISCONTINUED | OUTPATIENT
Start: 2021-07-31 | End: 2021-08-01

## 2021-07-31 RX ORDER — ATORVASTATIN CALCIUM 80 MG/1
20 TABLET, FILM COATED ORAL AT BEDTIME
Refills: 0 | Status: DISCONTINUED | OUTPATIENT
Start: 2021-07-31 | End: 2021-08-01

## 2021-07-31 RX ORDER — LATANOPROST 0.05 MG/ML
1 SOLUTION/ DROPS OPHTHALMIC; TOPICAL AT BEDTIME
Refills: 0 | Status: DISCONTINUED | OUTPATIENT
Start: 2021-07-31 | End: 2021-08-01

## 2021-07-31 RX ADMIN — DORZOLAMIDE HYDROCHLORIDE 1 DROP(S): 20 SOLUTION/ DROPS OPHTHALMIC at 22:14

## 2021-07-31 RX ADMIN — FAMOTIDINE 20 MILLIGRAM(S): 10 INJECTION INTRAVENOUS at 16:39

## 2021-07-31 RX ADMIN — EPINEPHRINE 3.4 MICROGRAM(S)/KG/MIN: 0.3 INJECTION INTRAMUSCULAR; SUBCUTANEOUS at 17:23

## 2021-07-31 RX ADMIN — Medication 6 MILLIGRAM(S): at 16:38

## 2021-07-31 RX ADMIN — FAMOTIDINE 20 MILLIGRAM(S): 10 INJECTION INTRAVENOUS at 18:35

## 2021-07-31 RX ADMIN — SODIUM CHLORIDE 100 MILLILITER(S): 9 INJECTION INTRAMUSCULAR; INTRAVENOUS; SUBCUTANEOUS at 22:11

## 2021-07-31 RX ADMIN — ONDANSETRON 4 MILLIGRAM(S): 8 TABLET, FILM COATED ORAL at 16:39

## 2021-07-31 RX ADMIN — ATORVASTATIN CALCIUM 20 MILLIGRAM(S): 80 TABLET, FILM COATED ORAL at 22:07

## 2021-07-31 RX ADMIN — Medication 145 MILLIGRAM(S): at 22:28

## 2021-07-31 RX ADMIN — LATANOPROST 1 DROP(S): 0.05 SOLUTION/ DROPS OPHTHALMIC; TOPICAL at 23:03

## 2021-07-31 RX ADMIN — Medication 40 MILLIGRAM(S): at 18:35

## 2021-07-31 RX ADMIN — EPINEPHRINE 0.3 MILLIGRAM(S): 0.3 INJECTION INTRAMUSCULAR; SUBCUTANEOUS at 16:44

## 2021-07-31 NOTE — ED PROVIDER NOTE - OBJECTIVE STATEMENT
65 y/o M with a PMHx of asthma, GERD, anxiety, glaucoma, HLD presents to the ED BIBEMS s/p allergic reaction. Pt has a known bee allergy and states he got stung multiple times by bees on his face around 15:50 PM today, (+)facial swelling. Pt c/o chest pressure and SOB. EMS states pt's BP was 60/40 en route to ED. Pt was given Benadryl and Epi by EMS PTA. Pt on arrival c/o feeling nauseous however after given meds, states he no longer feels nauseous anymore. States he felt that his throat was closing up, but after arriving to the ED states these symptoms started to subside, while on exam currently.  Denies having such a severe allergic reaction to bee stings in the past. Denies having any cardiac hx. Denies taking any medication for BP. 65 y/o M with a PMHx of asthma, GERD, anxiety, glaucoma, HLD presents to the ED BIBEMS s/p allergic reaction. Pt has a known bee allergy and states he got stung multiple times by bees on his face around 15:50 PM today, (+)facial swelling. Pt c/o chest pressure and SOB. EMS states pt's BP was 60/40 en route to ED. Pt was given Benadryl and Epi by EMS PTA. Pt on arrival c/o feeling nauseous however after given meds, states he no longer feels nauseous anymore. States he felt that his throat was closing up, but after arriving to the ED states these symptoms started to subside, while on exam currently.  Denies having such a severe allergic reaction to bee stings in the past. Denies having any cardiac hx. Denies taking any medication for BP. 65 y/o M with a PMHx of asthma, GERD, anxiety, glaucoma, HLD presents to the ED BIBEMS s/p allergic reaction. Pt has a known bee allergy and states he got stung multiple times by bees on his face around 15:50 PM today. Pt c/o chest pressure and SOB. EMS states pt's BP was 60/40 en route to ED. Pt was given Benadryl and Epi by EMS PTA. Pt on arrival c/o feeling nauseous however after given meds, states he no longer feels nauseous anymore. States he felt that his throat was closing up, but after arriving to the ED states these symptoms started to subside. Denies having such a severe allergic reaction to bee stings in the past. Denies having any cardiac hx. Denies taking any medication for BP. PCP: Dr. Jonathan Boxer 65 y/o M with a PMHx of asthma, GERD, anxiety, glaucoma, HLD presents to the ED BIBEMS s/p allergic reaction. Pt has a known bee allergy and states he was outside trimming bushes when he got stung multiple times by bees under his L eyelid around 15:50 PM today, (+)facial swelling. Pt c/o chest pressure and SOB. EMS states pt's BP was 60/40 en route to ED with BP improving to 109/60. Pt was given Benadryl and Epi by EMS PTA. Pt on arrival c/o feeling nauseous however after given meds, states he no longer feels nauseous anymore. States he felt that his throat was closing up, but after arriving to the ED states these symptoms started to subside, while on exam currently.  Denies having such a severe allergic reaction to bee stings in the past. Denies having any cardiac hx. Denies taking any medication for BP.  PCP: Dr. Jonathan Boxer

## 2021-07-31 NOTE — ED ADULT TRIAGE NOTE - CHIEF COMPLAINT QUOTE
multiple bee stings on face. c/o chest pain and sob. Hypotensive on scene. Epi and benadryl given by EMS. +facial swelling.

## 2021-07-31 NOTE — H&P ADULT - ASSESSMENT
A:    66M  HD # 1    Here for:    1. Anaphylactic shock 2/2  2. Allergic rxn, bee sting  3. Hyperglycemia (2/2 epinephrine)    This patient requires critical care for support of one or more vital organ systems with a high probability of imminent or life threatening deterioration in his/her condition    P:    Anaphylactic shock refractory to epi pen x 3, started on drip    Avoid deleriogenic meds.  HD monitoring. Epinephrine drip for anayphlactic shock, titrate as able to maintain MAP > 65. No hx of cardiac issues.  IS, OOB as able.   NPO + meds. Advance diet as able.  No s/s systemic infxn, withhold abx.  Histamine blockade.  s/p decadron. Solumedrol 40mg IV q8h x 3 doses written.  VTE ppx low risk (ambulatory) SCD in bed only, PUD pepcid.  f/u labs.  CXR clear.  Med reconciliation     Dispo: Accept to critical care. Titrate epinephrine drip. Steroids, histamine blockade. Epi pen at bedside. Monitor in ICU for rebound shock.    Discussed with Pt and Pt family at bedside.    TOTAL CRITICAL CARE TIME:   (EXCLUSIVE of any non bundled procedures)    Note: This time spent INCLUDES time spent directly as this patient's bedside with evaluation, review of chart including review of laboratory and imaging studies, interpretation of vital signs and cardiac output measurements, any necessary ventilator management, and time spent discussing plan of care with patient and family, including goals of care discussion.

## 2021-07-31 NOTE — ED ADULT NURSE REASSESSMENT NOTE - NS ED NURSE REASSESS COMMENT FT1
pt resting comfortably, seen by ICU pa, pt in no resp distress, to go to ICU, awaiting bed, aware of plan of care

## 2021-07-31 NOTE — ED PROVIDER NOTE - CRITICAL CARE ATTENDING CONTRIBUTION TO CARE
66M p/w anaphylaxis after bee stings to his face. hypotensive requiring multiple doses of IM epi and then epi drip to maintain BP. bradycardiac. zoll placed. ICU consult placed, evaluated and will be admitted to ICU.

## 2021-07-31 NOTE — ED PROVIDER NOTE - CLINICAL SUMMARY MEDICAL DECISION MAKING FREE TEXT BOX
65 y/o M with anaphylaxis s/p IM EPI and benadryl from EMS with initial some improvement of symptoms. Now recurred will give additional dose of IM epi and decadron, Pepcid, Zophran, 2 L IV fluid, EKG and reassess. Also reports chest heaviness will send labs with troponin.

## 2021-07-31 NOTE — H&P ADULT - NSICDXPASTMEDICALHX_GEN_ALL_CORE_FT
PAST MEDICAL HISTORY:  Anxiety     Asthma     GERD (gastroesophageal reflux disease)     Glaucoma     HLD (hyperlipidemia)

## 2021-07-31 NOTE — ED ADULT NURSE NOTE - NSIMPLEMENTINTERV_GEN_ALL_ED
Implemented All Universal Safety Interventions:  [place] to call system. Call bell, personal items and telephone within reach. Instruct patient to call for assistance. Room bathroom lighting operational. Non-slip footwear when patient is off stretcher. Physically safe environment: no spills, clutter or unnecessary equipment. Stretcher in lowest position, wheels locked, appropriate side rails in place.

## 2021-07-31 NOTE — ED PROVIDER NOTE - PROGRESS NOTE DETAILS
Keith Craig for Dr. [place]: pt with only mild clinical improvement from IM epinephrine 2nd dose, remains persistently hypotensive will  start epi drip and call MICU for evaluation.

## 2021-07-31 NOTE — H&P ADULT - HISTORY OF PRESENT ILLNESS
S:    Pt seen and examined  HD # 1  PMHx asthma, GERD, anxiety, glaucoma, HLD  Known bee sting allergy  Pt here after being stung by bee  c/o of chest pressure and SOB  Hypotensive en route with EMS, given epi IM x 1, benadryl IM x 1 with EMS en route  Epi IM x 2 more times in ER, shock refractory, started on epi drip, ICU consulted    7/31: Epi drip hanging. Pending labs.

## 2021-07-31 NOTE — H&P ADULT - NSHPPHYSICALEXAM_GEN_ALL_CORE
O:    Adult M lying in bed  No JVD trachea midline  Oropharynx normal,. uvula midline, no edema, no s/s angioedema  + swelling under L eye noted  S1S2+  CTA B/L  Abd soft NTND  No LE edema/swelling noted  Awake and alert  Besides area under L eye, skin pink and well perfused

## 2021-07-31 NOTE — ED ADULT NURSE NOTE - PMH
Anxiety    Anxiety    Asthma    Asthma    GERD (gastroesophageal reflux disease)    GERD (gastroesophageal reflux disease)    Glaucoma    Glaucoma    HLD (hyperlipidemia)    HLD (hyperlipidemia)

## 2021-07-31 NOTE — ED PROVIDER NOTE - NS_ ATTENDINGSCRIBEDETAILS _ED_A_ED_FT
Ila Houston MD: The history, relevant review of systems, past medical and surgical history, medical decision making, and physical examination was documented by the scribe in my presence and I attest to the accuracy of the documentation.

## 2021-07-31 NOTE — ED ADULT NURSE NOTE - NS ED NURSE REPORT GIVEN DT
"/68   Pulse 91   Temp 97.7  F (36.5  C) (Axillary)   Resp 18   Ht 1.7 m (5' 6.93\")   Wt 64.5 kg (142 lb 1.6 oz)   SpO2 98%   BMI 22.30 kg/m        AVSS. No c/o pain n/v/d overnight. Lidocaine patch in place. courtesy meal ordered for pt. Thong GONZALEZ about Hgb and type and screen. No response. No other replacements needed will continue POC.      Problem: Adult Inpatient Plan of Care  Goal: Plan of Care Review  Outcome: No Change  Goal: Patient-Specific Goal (Individualized)  Outcome: No Change  Goal: Absence of Hospital-Acquired Illness or Injury  Outcome: No Change  Intervention: Identify and Manage Fall Risk  Recent Flowsheet Documentation  Taken 2/8/2021 0000 by Morales Barahona RN  Safety Promotion/Fall Prevention:    clutter free environment maintained    nonskid shoes/slippers when out of bed    safety round/check completed  Intervention: Prevent Skin Injury  Recent Flowsheet Documentation  Taken 2/8/2021 0000 by Morales Barahona RN  Body Position: position changed independently  Intervention: Prevent and Manage VTE (Venous Thromboembolism) Risk  Recent Flowsheet Documentation  Taken 2/8/2021 0000 by Morales Barahona RN  VTE Prevention/Management:    ambulation promoted    fluids promoted  Goal: Optimal Comfort and Wellbeing  Outcome: No Change  Goal: Readiness for Transition of Care  Outcome: No Change     Problem: Infection  Goal: Absence of Infection Signs and Symptoms  Outcome: No Change  Intervention: Prevent or Manage Infection  Recent Flowsheet Documentation  Taken 2/8/2021 0000 by Morales Barahona RN  Isolation Precautions: cytotoxic precautions maintained     Problem: Bleeding Risk or Actual  Goal: Absence of Bleeding  Outcome: No Change     Problem: Oral Intake Inadequate  Goal: Improved Oral Intake  Outcome: No Change     Problem: Discharge Planning  Goal: Discharge Planning (Adult, OB, Behavioral, Peds)  Outcome: No Change     " 31-Jul-2021 18:41

## 2021-07-31 NOTE — ED ADULT NURSE NOTE - OBJECTIVE STATEMENT
pt presents to ed via ems after several bee stings to face and pt has known allergy, pt received IM epi, benadryl en route by ems, upon arrival pt fernando placed on zoll pads, bp hypotensive. 2 periph lines placed. see MAR for meds given due to pt symptoms of throat itchy swelling nausea and chest pain, pt mentating gcs 15 , ekg done

## 2021-08-01 ENCOUNTER — TRANSCRIPTION ENCOUNTER (OUTPATIENT)
Age: 66
End: 2021-08-01

## 2021-08-01 VITALS
SYSTOLIC BLOOD PRESSURE: 106 MMHG | HEART RATE: 66 BPM | OXYGEN SATURATION: 98 % | RESPIRATION RATE: 19 BRPM | DIASTOLIC BLOOD PRESSURE: 46 MMHG

## 2021-08-01 PROCEDURE — 99239 HOSP IP/OBS DSCHRG MGMT >30: CPT

## 2021-08-01 RX ORDER — EPINEPHRINE 0.3 MG/.3ML
1 INJECTION INTRAMUSCULAR; SUBCUTANEOUS
Qty: 2 | Refills: 0
Start: 2021-08-01

## 2021-08-01 RX ADMIN — SODIUM CHLORIDE 100 MILLILITER(S): 9 INJECTION INTRAMUSCULAR; INTRAVENOUS; SUBCUTANEOUS at 06:15

## 2021-08-01 NOTE — DISCHARGE NOTE NURSING/CASE MANAGEMENT/SOCIAL WORK - PATIENT PORTAL LINK FT
You can access the FollowMyHealth Patient Portal offered by [facility] by registering at the following website: [email or web address]. By joining [facility]’s FollowMyHealth portal, you will also be able to view your health information using other applications (apps) compatible with our system.

## 2021-08-01 NOTE — DISCHARGE NOTE PROVIDER - NSDCMRMEDTOKEN_GEN_ALL_CORE_FT
EpiPen 2-Vick 0.3 mg injectable kit: 1 application intramuscularly once, As Needed -for anaphylaxis

## 2021-08-01 NOTE — DISCHARGE NOTE PROVIDER - HOSPITAL COURSE
65 y/o M presented to the ED BIBEMS s/p allergic reaction. Pt has a known bee allergy and states he was outside trimming bushes when he got stung multiple times by bees under his L eyelid around 15:50 PM today, (+)facial swelling. Pt c/o chest pressure and SOB. EMS states pt's BP was 60/40 en route to ED with BP improving to 109/60. Pt was given Benadryl and Epi by EMS PTA. Patient was given Solumedrol, Pepcid and was on  an EPI drip.  The drip was shut off yesterday evening.  BP has improved and all swelling has resolved.  I spoke with the patient on the upmost importance to avoid contact with bees and wasps.  He will be D/C with a prescription for an EPI pen that he was instructed to carry at all times along with benadryl.

## 2021-08-01 NOTE — DISCHARGE NOTE PROVIDER - CARE PROVIDER_API CALL
Boxer, Jonathan A  INTERNAL MEDICINE  [address] [place] [address]  Phone: [phone]  Fax: [phone]  Follow Up Time: 1 week

## 2021-08-02 ENCOUNTER — NON-APPOINTMENT (OUTPATIENT)
Age: 66
End: 2021-08-02

## 2021-08-02 PROBLEM — H40.9 UNSPECIFIED GLAUCOMA: Chronic | Status: ACTIVE | Noted: 2021-07-31

## 2021-08-02 PROBLEM — E78.5 HYPERLIPIDEMIA, UNSPECIFIED: Chronic | Status: ACTIVE | Noted: 2021-07-31

## 2021-08-02 PROBLEM — F41.9 ANXIETY DISORDER, UNSPECIFIED: Chronic | Status: ACTIVE | Noted: 2021-07-31

## 2021-08-02 PROBLEM — K21.9 GASTRO-ESOPHAGEAL REFLUX DISEASE WITHOUT ESOPHAGITIS: Chronic | Status: ACTIVE | Noted: 2021-07-31

## 2021-08-02 PROBLEM — J45.909 UNSPECIFIED ASTHMA, UNCOMPLICATED: Chronic | Status: ACTIVE | Noted: 2021-07-31

## 2021-08-04 DIAGNOSIS — E78.5 HYPERLIPIDEMIA, UNSPECIFIED: ICD-10-CM

## 2021-08-04 DIAGNOSIS — T78.2XXA ANAPHYLACTIC SHOCK, UNSPECIFIED, INITIAL ENCOUNTER: ICD-10-CM

## 2021-08-04 DIAGNOSIS — T63.441A TOXIC EFFECT OF VENOM OF BEES, ACCIDENTAL (UNINTENTIONAL), INITIAL ENCOUNTER: ICD-10-CM

## 2021-08-04 DIAGNOSIS — T44.5X5A ADVERSE EFFECT OF PREDOMINANTLY BETA-ADRENORECEPTOR AGONISTS, INITIAL ENCOUNTER: ICD-10-CM

## 2021-08-04 DIAGNOSIS — F41.9 ANXIETY DISORDER, UNSPECIFIED: ICD-10-CM
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
## 2021-08-04 DIAGNOSIS — I95.9 HYPOTENSION, UNSPECIFIED: ICD-10-CM

## 2021-08-04 DIAGNOSIS — H40.9 UNSPECIFIED GLAUCOMA: ICD-10-CM

## 2021-08-04 DIAGNOSIS — R73.9 HYPERGLYCEMIA, UNSPECIFIED: ICD-10-CM

## 2021-08-05 ENCOUNTER — APPOINTMENT (OUTPATIENT)
Dept: INTERNAL MEDICINE | Facility: CLINIC | Age: 66
End: 2021-08-05
Payer: MEDICARE

## 2021-08-05 VITALS
BODY MASS INDEX: 27.49 KG/M2 | SYSTOLIC BLOOD PRESSURE: 104 MMHG | WEIGHT: 192 LBS | DIASTOLIC BLOOD PRESSURE: 60 MMHG | RESPIRATION RATE: 18 BRPM | TEMPERATURE: 98.1 F | HEART RATE: 68 BPM | OXYGEN SATURATION: 98 % | HEIGHT: 70 IN

## 2021-08-05 DIAGNOSIS — T78.2XXD ANAPHYLACTIC SHOCK, UNSPECIFIED, SUBSEQUENT ENCOUNTER: ICD-10-CM

## 2021-08-05 PROCEDURE — 94010 BREATHING CAPACITY TEST: CPT

## 2021-08-05 PROCEDURE — 99496 TRANSJ CARE MGMT HIGH F2F 7D: CPT | Mod: 25

## 2021-08-05 PROCEDURE — 94727 GAS DIL/WSHOT DETER LNG VOL: CPT

## 2021-08-05 PROCEDURE — ZZZZZ: CPT

## 2021-08-05 PROCEDURE — 94729 DIFFUSING CAPACITY: CPT

## 2021-08-05 RX ORDER — ALBUTEROL SULFATE 2.5 MG/3ML
(2.5 MG/3ML) SOLUTION RESPIRATORY (INHALATION)
Qty: 75 | Refills: 0 | Status: DISCONTINUED | COMMUNITY
Start: 2018-01-20 | End: 2021-08-05

## 2021-08-05 RX ORDER — FLUTICASONE PROPIONATE AND SALMETEROL 50; 250 UG/1; UG/1
250-50 POWDER RESPIRATORY (INHALATION)
Qty: 3 | Refills: 3 | Status: DISCONTINUED | COMMUNITY
Start: 2018-02-15 | End: 2021-08-05

## 2021-08-05 RX ORDER — ECONAZOLE NITRATE 10 MG/G
1 CREAM TOPICAL
Qty: 30 | Refills: 2 | Status: DISCONTINUED | COMMUNITY
Start: 2019-04-08 | End: 2021-08-05

## 2021-08-05 RX ORDER — AZELASTINE HYDROCHLORIDE 137 UG/1
0.1 SPRAY, METERED NASAL
Qty: 3 | Refills: 3 | Status: DISCONTINUED | COMMUNITY
Start: 2019-10-22 | End: 2021-08-05

## 2021-08-05 NOTE — PLAN
[FreeTextEntry1] : \par \par Follow up with Dr.Boxer for all primary care issues.\par \par Pt evaluated and plan reviewed with

## 2021-08-05 NOTE — DATA REVIEWED
[FreeTextEntry1] : PFT was performed.  Lung volumes and DLCO are normal.  Flow rates show reduction in FEV1/FVC and  25-75% at 65% predicted.  Bronchodilator reactivity was not assessed.

## 2021-08-05 NOTE — HISTORY OF PRESENT ILLNESS
[FreeTextEntry1] : HFU/ asthma [de-identified] : On 7/31 Trimming shrubs and stung by hornet below L eye.  Applied ice and began to get palpitations, SOB, chest pressure, itching.  911 was called and brought to .   See below.\par \par Discharge Date	01-Aug-2021 \par Admission Date	31-Jul-2021 17:58 \par Reason for Admission	Anaphylaxis 2/2 bee sting \par Hospital Course	 \par 65 y/o M presented to the ED BIBEMS s/p allergic reaction. Pt has a known bee \par allergy and states he was outside trimming bushes when he got stung multiple \par times by bees under his L eyelid around 15:50 PM today, (+)facial swelling. Pt \par c/o chest pressure and SOB. EMS states pt's BP was 60/40 en route to ED with BP \par improving to 109/60. Pt was given Benadryl and Epi by EMS PTA. Patient was \par given Solumedrol, Pepcid and was on \par an EPI drip.  The drip was shut off yesterday evening.  BP has improved and all \par swelling has resolved. \par I spoke with the patient on the upmost importance to avoid contact with bees \par and wasps.  He will be D/C with a prescription for an EPI pen that he was \par instructed to carry at all times along with benadryl. \par \par 7/2021 - Currently he is feeling well.  NO further symptoms.  He denies cough or SOB.  He does note occasional wheezing with deep exhalation.  He stopped Advair many months ago. Denies any rescue inhaler usage.  Has EPi pen, benadryl and pepcid as in case regimen.     \par

## 2021-12-07 ENCOUNTER — APPOINTMENT (OUTPATIENT)
Dept: DERMATOLOGY | Facility: CLINIC | Age: 66
End: 2021-12-07
Payer: MEDICARE

## 2021-12-07 DIAGNOSIS — Z00.00 ENCOUNTER FOR GENERAL ADULT MEDICAL EXAMINATION W/OUT ABNORMAL FINDINGS: ICD-10-CM

## 2021-12-07 PROCEDURE — 99213 OFFICE O/P EST LOW 20 MIN: CPT | Mod: 25

## 2021-12-07 PROCEDURE — 10060 I&D ABSCESS SIMPLE/SINGLE: CPT

## 2021-12-07 PROCEDURE — 17110 DESTRUCTION B9 LES UP TO 14: CPT | Mod: 59

## 2021-12-07 RX ORDER — PREDNISONE 20 MG/1
20 TABLET ORAL DAILY
Qty: 6 | Refills: 0 | Status: DISCONTINUED | COMMUNITY
Start: 2021-08-05 | End: 2021-12-07

## 2021-12-07 NOTE — PHYSICAL EXAM
[Alert] : alert [Oriented x 3] : ~L oriented x 3 [Well Nourished] : well nourished [Full Body Skin Exam Performed] : performed [FreeTextEntry3] : A full skin exam was performed including the scalp, face (including lips, ears, nose and eyes), neck, chest, abdomen, back, buttocks, upper extremities and lower extremities.  The patient declined examination of the genitalia.  \par The exam revealed the following benign growths:\par Preston pigmented nevi.\par Seborrheic keratoses.\par Lentigines.\par Cherry angioma.\par \par Cystic erythematous papule, with central comedonal orifice.  Right chest.\par \par Greasy tan erythematous papules, face x 4, left posterior shoulder.

## 2021-12-07 NOTE — HISTORY OF PRESENT ILLNESS
[FreeTextEntry1] : Patient presents for skin examination. [de-identified] : Patient with irritated lesions of the face, and left upper back.  Slow growth in size and number.  No bleeding.\par Also with bump of the right chest, with slow growth.  No self tx.

## 2021-12-07 NOTE — ASSESSMENT
[FreeTextEntry1] : A complete skin examination was performed.  There is no evidence of skin cancer.  We discussed the importance of photoprotection, including the use of hats, protective clothing and sunscreens with an SPF of at least 30.  Sun avoidance was also discussed.  The ABCDE's of melanoma was discussed.  Regular skin exams recommended.\par \par Discussed primary MD's.\par Discussed hand surgery evaluation for Dupuytren's contractures - bilateral hands..

## 2022-02-01 ENCOUNTER — APPOINTMENT (OUTPATIENT)
Dept: INTERNAL MEDICINE | Facility: CLINIC | Age: 67
End: 2022-02-01
Payer: MEDICARE

## 2022-02-01 ENCOUNTER — NON-APPOINTMENT (OUTPATIENT)
Age: 67
End: 2022-02-01

## 2022-02-01 VITALS
RESPIRATION RATE: 18 BRPM | WEIGHT: 196 LBS | HEIGHT: 70 IN | SYSTOLIC BLOOD PRESSURE: 120 MMHG | HEART RATE: 63 BPM | OXYGEN SATURATION: 97 % | TEMPERATURE: 98 F | BODY MASS INDEX: 28.06 KG/M2 | DIASTOLIC BLOOD PRESSURE: 60 MMHG

## 2022-02-01 PROCEDURE — 99214 OFFICE O/P EST MOD 30 MIN: CPT | Mod: 25

## 2022-02-01 PROCEDURE — 94010 BREATHING CAPACITY TEST: CPT

## 2022-02-01 NOTE — PLAN
[FreeTextEntry1] : 1. Continue with medication as outlined above.\par \par #2. Followup in 6 months with full pulmonary function testing.\par \par 3. Patient will keep several EpiPens in his home for possible use if he develops another anaphylactic episode after an insect sting.\par \par 4. Routine medical followup with his primary care physician.

## 2022-02-01 NOTE — HISTORY OF PRESENT ILLNESS
[FreeTextEntry1] : The patient comes in today for a routine pulmonary followup evaluation.\par  [de-identified] : The patient states, from a pulmonary standpoint, he is doing extremely well. He remains compliant with his Arnuity, taking one puff daily. He has not had any flareups of his underlying asthma or airway reactivity. He does occasionally have a slight nocturnal cough. He has not required his albuterol for rescue. He does not exercise. He denies any wheezing or sputum production.\par \par The patient does have a slight postnasal drip. He is not on any medication for this process. He denies any purulent nasal secretions. There are no other acute constitutional pulmonary issues at this time. He denies breathlessness. He comes in for this assessment

## 2022-02-01 NOTE — PHYSICAL EXAM
[General Appearance - Alert] : alert [General Appearance - In No Acute Distress] : in no acute distress [General Appearance - Well Developed] : well developed [General Appearance - Well-Appearing] : healthy appearing [Outer Ear] : the ears and nose were normal in appearance [Hearing Threshold Finger Rub Not Andrews] : hearing was normal [Examination Of The Oral Cavity] : the lips and gums were normal [Nasal Cavity] : the nasal mucosa and septum were normal [Oropharynx] : the oropharynx was normal [Neck Appearance] : the appearance of the neck was normal [Neck Cervical Mass (___cm)] : no neck mass was observed [Jugular Venous Distention Increased] : there was no jugular-venous distention [Thyroid Diffuse Enlargement] : the thyroid was not enlarged [] : no respiratory distress [Respiration, Rhythm And Depth] : normal respiratory rhythm and effort [Exaggerated Use Of Accessory Muscles For Inspiration] : no accessory muscle use [Auscultation Breath Sounds / Voice Sounds] : lungs were clear to auscultation bilaterally [Heart Rate And Rhythm] : heart rate was normal and rhythm regular [Heart Sounds] : normal S1 and S2 [Heart Sounds Gallop] : no gallops [Heart Sounds Pericardial Friction Rub] : no pericardial rub [Edema] : there was no peripheral edema [Veins - Varicosity Changes] : there were no varicosital changes [Abdomen Soft] : soft [FreeTextEntry1] : obese [Cervical Lymph Nodes Enlarged Anterior Bilaterally] : anterior cervical [Supraclavicular Lymph Nodes Enlarged Bilaterally] : supraclavicular [Abnormal Walk] : normal gait [Nail Clubbing] : no clubbing  or cyanosis of the fingernails [Musculoskeletal - Swelling] : no joint swelling seen

## 2022-05-09 ENCOUNTER — APPOINTMENT (OUTPATIENT)
Dept: INTERNAL MEDICINE | Facility: CLINIC | Age: 67
End: 2022-05-09
Payer: MEDICARE

## 2022-05-09 DIAGNOSIS — J06.9 ACUTE UPPER RESPIRATORY INFECTION, UNSPECIFIED: ICD-10-CM

## 2022-05-09 PROCEDURE — 99441: CPT | Mod: 95

## 2022-05-09 NOTE — HISTORY OF PRESENT ILLNESS
[Other Location: e.g. School (Enter Location, City,State)___] : at [unfilled], at the time of the visit. [Medical Office: ([facility])___] : at the medical office located in  [Verbal consent obtained from patient] : the patient, [unfilled] [FreeTextEntry8] : Cough for few weeks.  Developed cough and cold symptoms few weeks ago.  Cough worse at night which he felt was more due to PND.  He denies fevers.  Cough can be  productive at times, clear sputum.  No nasal congestion.  No wheeze or SOB.  \par Compliant with Arnuity.  Symptoms are overall improving. wife covid positive few weeks ago.  He never tested himself.  \par To start medrol dose pack with food.  Call if symptoms not improving, any fevers, purulent sputum.  OV if not better.

## 2022-06-14 ENCOUNTER — NON-APPOINTMENT (OUTPATIENT)
Age: 67
End: 2022-06-14

## 2022-06-14 ENCOUNTER — APPOINTMENT (OUTPATIENT)
Dept: ORTHOPEDIC SURGERY | Facility: CLINIC | Age: 67
End: 2022-06-14
Payer: MEDICARE

## 2022-06-14 VITALS — WEIGHT: 195 LBS | HEIGHT: 70 IN | BODY MASS INDEX: 27.92 KG/M2 | OXYGEN SATURATION: 98 %

## 2022-06-14 DIAGNOSIS — S76.112A STRAIN OF LEFT QUADRICEPS MUSCLE, FASCIA AND TENDON, INITIAL ENCOUNTER: ICD-10-CM

## 2022-06-14 DIAGNOSIS — M17.31 UNILATERAL POST-TRAUMATIC OSTEOARTHRITIS, RIGHT KNEE: ICD-10-CM

## 2022-06-14 PROCEDURE — 99204 OFFICE O/P NEW MOD 45 MIN: CPT

## 2022-06-14 PROCEDURE — 73564 X-RAY EXAM KNEE 4 OR MORE: CPT | Mod: 50

## 2022-06-14 NOTE — ADDENDUM
[FreeTextEntry1] : Documented by Ashkan Tello acting as a scribe for Dr. Headley and Fish Barron PA-C on 06/14/2022. \par \par All medical record entries made by the Scribe were at my, Dr. Headley, and Fish Barron's, direction and\par personally dictated by me on 06/14/2022. I have reviewed the chart and agree that the record\par accurately reflects my personal performance of the history, physical exam, procedure and imaging.

## 2022-06-14 NOTE — PHYSICAL EXAM
[de-identified] : Physical Exam:\par General: Well appearing, no acute distress\par Neurologic: A&Ox3, No focal deficits\par Head: NCAT without abrasions, lacerations, or ecchymosis to head, face, or scalp\par Eyes: No scleral icterus, no gross abnormalities\par Respiratory: Equal chest wall expansion bilaterally, no accessory muscle use\par Lymphatic: No lymphadenopathy palpated\par Skin: Warm and dry\par Psychiatric: Normal mood and affect \par \par Bilateral knee: There is moderate effusion. Range of motion is 0-120°. Painful at the extremes of range of motion. Left (L) knee PF pain with patellar mobility, crepitus noted with ROM. \par There is medial and lateral joint line tenderness. \par Quadriceps strength is 4/5. There is some muscle loss in the quadriceps. \par Anteroposterior and mediolateral stability is intact Roosevelt test is negative. Alignment of the knee is in 5° of varus.  [de-identified] : The following radiographs were ordered and read by me during this patients visit. I reviewed each radiograph in detail with the patient and discussed the findings as highlighted below. \par \par 4 views of the Right (R) knee show Osteophyte formation noted  no fracture, dislocation or bony lesions. There is evidence of degenerative change in the medial, lateral and patellofemoral compartments with moderate to severe narrowing of the joint space. Otherwise unremarkable. \par \par 4 views of the Left (L) knee show no fracture, dislocation or bony lesions. There is evidence of degenerative change in the medial, lateral and patellofemoral compartments with moderate narrowing of the joint space. Otherwise unremarkable.

## 2022-06-14 NOTE — HISTORY OF PRESENT ILLNESS
[Stable] : stable [___ mths] : [unfilled] month(s) ago [2] : a current pain level of 2/10 [6] : an average pain level of 6/10 [Walking] : worsened by walking [Knee Flexion] : worsened with knee flexion [Knee Extension] : worsened with knee extension [Rest] : relieved by rest [de-identified] : ROSE DIGGS is a 67 year male being seen for initial visit bilat knee pain (R>L). PSHx of meniscectomy in Right (R) knee several yrs ago. In regards to his Right (R) knee, patient reports constant pain in his lateral aspect of the knee. He denies numbness or tingling down to extremity. He describes pain mostly with any WB activities. In regards to his Left (L) knee, patient reports pain over PF joint which began soon after doing some yard work. He denies numbness or tingling. Patient has always been using his Left (L) leg more than Right (R), and given pain, he feels extreme pain and has to taken occasional rest while walking.

## 2022-07-11 ENCOUNTER — APPOINTMENT (OUTPATIENT)
Dept: DERMATOLOGY | Facility: CLINIC | Age: 67
End: 2022-07-11

## 2022-07-11 PROCEDURE — 17110 DESTRUCTION B9 LES UP TO 14: CPT

## 2022-07-11 NOTE — HISTORY OF PRESENT ILLNESS
[FreeTextEntry1] : Fit in for forehead lesion. [de-identified] : Notes dark lesion, recent growth.  No bleeding.  no self tx.

## 2022-07-11 NOTE — PHYSICAL EXAM
[FreeTextEntry3] : erythematous greasy tan papules, right frontal hairline x 2.\par \par hyperpigmented 2 mm macule, inferior aspect of the right pre-auricular region.  2 mm macule, left anterior thigh.  Both homogeneous and bland.

## 2022-07-12 ENCOUNTER — RX RENEWAL (OUTPATIENT)
Age: 67
End: 2022-07-12

## 2022-07-22 NOTE — DISCUSSION/SUMMARY
[de-identified] : ROSE DIGGS is a 67 year male being seen for initial visit bilat knee pain. PSHx of meniscectomy in Right (R) knee several yrs ago. In regards to his Right (R) knee, patient reports constant pain in his lateral aspect of the knee. He denies numbness or tingling down to extremity. He describes pain mostly with any WB activities. In regards to his Left (L) knee, patient reports pain over PF joint which began soon after doing some yard work. He denies numbness or tingling. Patient has always been using his Left (L) leg more than Right (R), and given pain, he feels extreme pain and has to taken occasional rest while walking. \par \par We had a thorough discussion regarding the nature of his pain, the pathophysiology, as well as all treatment options. Pt presents with BILAT R>L knee pain, which is severe in intensity. The pain substantially limits activities of daily living. Walking tolerance is reduced. Medication and activity modification have been minimally effective for a period lasting greater than three months in duration. Assistive devices and external support were not deemed by the patient to be helpful in improving their function. Due to the severity of osteoarthritis and level of pain, physical therapy is contraindicated. Pain and restriction of function are intolerable at this time.\par \par I discussed the treatment of degenerative arthritis with the patient at length today. I described the spectrum of treatment from nonoperative modalities to total joint arthroplasty. Noninvasive and nonoperative treatment modalities include weight reduction, activity modification with low impact exercise, PRN use of acetaminophen or anti-inflammatory medication if tolerated, glucosamine/chondroitin supplements, and physical therapy. Further treatments can include corticosteroid injection and the use of hyaluronic acid injections. Definitive treatment can certainly include total joint arthroplasty also. The risks and benefits of each treatment options was discussed and all questions were answered.\par  \par Patient was given prescription of formal physical therapy that he will perform 2x/wk for 6-8 wks. Conservative measures of treatment include rest until asymptomatic, activity avoidance, NSAID's PRN, application to ice to the area 2-3x daily for 20 minutes, with gradual return to activities. Patient will follow up in 6-8 wks for repeat clinical assessment. All questions were answered and the patient verbalized understanding. The patient is in agreement with this treatment plan.  see chart

## 2022-08-15 ENCOUNTER — APPOINTMENT (OUTPATIENT)
Dept: INTERNAL MEDICINE | Facility: CLINIC | Age: 67
End: 2022-08-15

## 2022-08-23 ENCOUNTER — APPOINTMENT (OUTPATIENT)
Dept: DERMATOLOGY | Facility: CLINIC | Age: 67
End: 2022-08-23

## 2022-08-23 DIAGNOSIS — B36.0 PITYRIASIS VERSICOLOR: ICD-10-CM

## 2022-08-23 PROCEDURE — 99213 OFFICE O/P EST LOW 20 MIN: CPT

## 2022-08-23 RX ORDER — METHYLPREDNISOLONE 4 MG/1
4 TABLET ORAL
Qty: 1 | Refills: 0 | Status: DISCONTINUED | COMMUNITY
Start: 2022-05-10 | End: 2022-08-23

## 2022-08-23 NOTE — PHYSICAL EXAM
[FreeTextEntry3] : Virgil hyperpigmented patches, proximal thighs and left back.  Hypopigmented patches of the shoulders.\par \par SK's of the left back.

## 2022-08-23 NOTE — ASSESSMENT
[FreeTextEntry1] : TV\par Education.\par Nizoral cream bid x 3 weeks.\par H&S Int. tx shampoo - body washes weekly.\par \par SK's\par Benign

## 2022-08-23 NOTE — HISTORY OF PRESENT ILLNESS
[FreeTextEntry1] : Fit in for rash. [de-identified] : Groin, back and shoulders.\par Also dark lesion of the left back.

## 2022-11-07 ENCOUNTER — APPOINTMENT (OUTPATIENT)
Dept: INTERNAL MEDICINE | Facility: CLINIC | Age: 67
End: 2022-11-07

## 2022-11-07 ENCOUNTER — NON-APPOINTMENT (OUTPATIENT)
Age: 67
End: 2022-11-07

## 2022-11-07 VITALS
HEIGHT: 70 IN | TEMPERATURE: 97.7 F | OXYGEN SATURATION: 95 % | DIASTOLIC BLOOD PRESSURE: 64 MMHG | WEIGHT: 193.98 LBS | RESPIRATION RATE: 16 BRPM | HEART RATE: 81 BPM | BODY MASS INDEX: 27.77 KG/M2 | SYSTOLIC BLOOD PRESSURE: 124 MMHG

## 2022-11-07 DIAGNOSIS — J04.10 ACUTE TRACHEITIS W/OUT OBSTRUCTION: ICD-10-CM

## 2022-11-07 PROCEDURE — 99214 OFFICE O/P EST MOD 30 MIN: CPT | Mod: 25

## 2022-11-07 PROCEDURE — 94060 EVALUATION OF WHEEZING: CPT

## 2022-11-07 NOTE — PLAN
[FreeTextEntry1] : 1.  Continue with medical regimen and bronchodilators as outlined above.\par \par 2.  We will now institute prednisone, in which she will take 60 mg for 3 days, followed by 40 mg for 3 days, and 20 mg for 4 days, before discontinuing.\par \par 3.  Tessalon Perles 1-2 p.o. every 4-6 hours as needed\par \par 4.  Follow-up in 6 months with full pulmonary function testing.\par \par 5.  The patient will follow up with his primary care physician, Dr. Boxer.  He will contact this office immediately, if his above-noted symptoms persist or do not improve.

## 2022-11-07 NOTE — PHYSICAL EXAM
[No Acute Distress] : no acute distress [Well Nourished] : well nourished [No JVD] : no jugular venous distention [Supple] : supple [No Respiratory Distress] : no respiratory distress  [No Accessory Muscle Use] : no accessory muscle use [Normal Rate] : normal rate  [Normal S1, S2] : normal S1 and S2 [No Edema] : there was no peripheral edema [No Extremity Clubbing/Cyanosis] : no extremity clubbing/cyanosis [Soft] : abdomen soft [Normal Bowel Sounds] : normal bowel sounds [Normal Supraclavicular Nodes] : no supraclavicular lymphadenopathy [Normal Posterior Cervical Nodes] : no posterior cervical lymphadenopathy [Normal Anterior Cervical Nodes] : no anterior cervical lymphadenopathy [de-identified] : A minimal end expiratory wheezes noted, primarily with forced maneuvers.

## 2022-11-07 NOTE — HISTORY OF PRESENT ILLNESS
[FreeTextEntry8] : The patient comes in today for an acute evaluation.  He states That he was exposed to his grandson that had "pneumonia," several weeks ago.  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  The exact cause, whether bacterial or viral, was not known.\par \par The patient states that he noted the onset of a dry nonproductive cough approximately 2 weeks ago.  He notes that he has been compliant with his Arnuity.  He had slight wheezing.  He denies having any nocturnal cough symptoms.  There was no pleuritic pain or chest discomfort or pressure.  He denies having any fevers or rigors.  He spoke to his primary care physician, who did a telemetry visit with him, and he placed him on a Zithromax Z-JUSTUS.  He completed this approximately 4 to 5 days ago.\par \par The patient typically has postnasal drip.  He notes that his symptoms are not worse.  He denies any purulent nasal secretions.  There is no purulent sputum.  He now comes in for this assessment

## 2022-12-07 ENCOUNTER — APPOINTMENT (OUTPATIENT)
Dept: DERMATOLOGY | Facility: CLINIC | Age: 67
End: 2022-12-07

## 2022-12-07 DIAGNOSIS — L82.0 INFLAMED SEBORRHEIC KERATOSIS: ICD-10-CM

## 2022-12-07 DIAGNOSIS — D18.01 HEMANGIOMA OF SKIN AND SUBCUTANEOUS TISSUE: ICD-10-CM

## 2022-12-07 PROCEDURE — 17110 DESTRUCTION B9 LES UP TO 14: CPT

## 2022-12-07 PROCEDURE — 99213 OFFICE O/P EST LOW 20 MIN: CPT | Mod: 25

## 2022-12-07 RX ORDER — AZITHROMYCIN 250 MG/1
250 TABLET, FILM COATED ORAL
Qty: 6 | Refills: 0 | Status: DISCONTINUED | COMMUNITY
Start: 2022-10-27

## 2022-12-07 NOTE — HISTORY OF PRESENT ILLNESS
[FreeTextEntry1] : Patient presents for skin examination. [de-identified] : Irritated lesions of the right arm, scalp and left malar.

## 2022-12-07 NOTE — PHYSICAL EXAM
[Alert] : alert [Oriented x 3] : ~L oriented x 3 [Well Nourished] : well nourished [Full Body Skin Exam Performed] : performed [FreeTextEntry3] : A full skin exam was performed including the scalp, face (including lips, ears, nose and eyes), neck, chest, abdomen, back, buttocks, upper extremities and lower extremities.  The patient declined examination of the genitalia.  \par The exam revealed the following benign growths:\par Nash pigmented nevi.\par Seborrheic keratoses.\par Lentigines.\par Cherry angioma.\par \par Greasy tan and erythematous papules, right upper arm, scalp x 4 and left malar.

## 2023-02-23 ENCOUNTER — APPOINTMENT (OUTPATIENT)
Dept: INTERNAL MEDICINE | Facility: CLINIC | Age: 68
End: 2023-02-23
Payer: MEDICARE

## 2023-02-23 ENCOUNTER — NON-APPOINTMENT (OUTPATIENT)
Age: 68
End: 2023-02-23

## 2023-02-23 VITALS
BODY MASS INDEX: 28.06 KG/M2 | DIASTOLIC BLOOD PRESSURE: 62 MMHG | TEMPERATURE: 98 F | RESPIRATION RATE: 16 BRPM | HEIGHT: 70 IN | SYSTOLIC BLOOD PRESSURE: 120 MMHG | WEIGHT: 196 LBS | HEART RATE: 67 BPM | OXYGEN SATURATION: 96 %

## 2023-02-23 PROCEDURE — 94060 EVALUATION OF WHEEZING: CPT

## 2023-02-23 PROCEDURE — 99213 OFFICE O/P EST LOW 20 MIN: CPT | Mod: 25

## 2023-02-23 NOTE — HISTORY OF PRESENT ILLNESS
[FreeTextEntry8] : 68M w/ PMHX significant for asthma presents to office today c/o cough productive of brown sputum, wheezing, and nasal congestion x 7 days. He was away on vacation when he started to come down with cough, he has been taking Tessalon Perles sporadically with improvement. He has had to use his rescue inhaler once in the last week. He denies fever or chills, or any other constitutional symptoms. His 2 year old grandson recently had Croup.

## 2023-02-23 NOTE — REVIEW OF SYSTEMS
[Fatigue] : fatigue [Nasal Discharge] : nasal discharge [Wheezing] : wheezing [Cough] : cough [Fever] : no fever [Chills] : no chills [Night Sweats] : no night sweats [Discharge] : no discharge [Redness] : no redness [Dryness] : no dryness [Earache] : no earache [Hearing Loss] : no hearing loss [Nosebleeds] : no nosebleeds [Postnasal Drip] : no postnasal drip [Sore Throat] : no sore throat [Chest Pain] : no chest pain [Palpitations] : no palpitations [Lower Ext Edema] : no lower extremity edema [Orthopena] : no orthopnea [Shortness Of Breath] : no shortness of breath [Dyspnea on Exertion] : not dyspnea on exertion [Nausea] : no nausea [Diarrhea] : no diarrhea [Vomiting] : no vomiting [Joint Pain] : no joint pain [Skin Rash] : no skin rash [Headache] : no headache [Dizziness] : no dizziness [Fainting] : no fainting [Swollen Glands] : no swollen glands

## 2023-02-23 NOTE — PHYSICAL EXAM
[No Acute Distress] : no acute distress [Normal Sclera/Conjunctiva] : normal sclera/conjunctiva [Normal Outer Ear/Nose] : the outer ears and nose were normal in appearance [No JVD] : no jugular venous distention [No Respiratory Distress] : no respiratory distress  [Normal Rate] : normal rate  [Regular Rhythm] : with a regular rhythm [No Edema] : there was no peripheral edema [No Extremity Clubbing/Cyanosis] : no extremity clubbing/cyanosis [Normal Anterior Cervical Nodes] : no anterior cervical lymphadenopathy [No Joint Swelling] : no joint swelling [No Focal Deficits] : no focal deficits [Normal Gait] : normal gait [Normal Affect] : the affect was normal [Normal Insight/Judgement] : insight and judgment were intact [de-identified] : bilateral wheezing, cough

## 2023-02-23 NOTE — PLAN
[FreeTextEntry1] : 1. Start Azithromycin 500mg PO QD x 7 days.\par \par 2. Start Prednisone 20mg PO TID x 3 days then BID x 3 days, then QD x 4 days then 10mg QD x 4 days.\par \par 3. Continue Tessalon Perles Q 6H PRN for cough. \par \par 4. F/U if no improvement in symptoms.

## 2023-05-12 ENCOUNTER — NON-APPOINTMENT (OUTPATIENT)
Age: 68
End: 2023-05-12

## 2023-05-12 ENCOUNTER — APPOINTMENT (OUTPATIENT)
Dept: INTERNAL MEDICINE | Facility: CLINIC | Age: 68
End: 2023-05-12
Payer: MEDICARE

## 2023-05-12 VITALS
BODY MASS INDEX: 28.35 KG/M2 | WEIGHT: 198 LBS | TEMPERATURE: 98.4 F | DIASTOLIC BLOOD PRESSURE: 60 MMHG | RESPIRATION RATE: 16 BRPM | SYSTOLIC BLOOD PRESSURE: 128 MMHG | HEIGHT: 70 IN | HEART RATE: 64 BPM | OXYGEN SATURATION: 96 %

## 2023-05-12 PROCEDURE — 94060 EVALUATION OF WHEEZING: CPT

## 2023-05-12 PROCEDURE — 99213 OFFICE O/P EST LOW 20 MIN: CPT | Mod: 25

## 2023-05-12 PROCEDURE — 96372 THER/PROPH/DIAG INJ SC/IM: CPT | Mod: 59

## 2023-05-12 RX ORDER — TRIAMCINOLONE ACETONIDE 40 MG/ML
40 SUSPENSION INTRA-ARTERIAL; INTRAMUSCULAR
Qty: 4 | Refills: 0 | Status: COMPLETED | OUTPATIENT
Start: 2023-05-12

## 2023-05-12 RX ADMIN — TRIAMCINOLONE ACETONIDE 0 MG/ML: 400 INJECTION, SUSPENSION INTRA-ARTICULAR; INTRAMUSCULAR at 00:00

## 2023-05-12 NOTE — PHYSICAL EXAM
[No Acute Distress] : no acute distress [Normal Sclera/Conjunctiva] : normal sclera/conjunctiva [Normal Outer Ear/Nose] : the outer ears and nose were normal in appearance [Normal Oropharynx] : the oropharynx was normal [No JVD] : no jugular venous distention [No Lymphadenopathy] : no lymphadenopathy [Supple] : supple [No Respiratory Distress] : no respiratory distress  [No Accessory Muscle Use] : no accessory muscle use [Normal Rate] : normal rate  [Regular Rhythm] : with a regular rhythm [No Edema] : there was no peripheral edema [No Extremity Clubbing/Cyanosis] : no extremity clubbing/cyanosis [Normal Anterior Cervical Nodes] : no anterior cervical lymphadenopathy [No Joint Swelling] : no joint swelling [No Focal Deficits] : no focal deficits [Normal Gait] : normal gait [Normal Affect] : the affect was normal [Normal Insight/Judgement] : insight and judgment were intact [de-identified] : bilateral wheezing, cough

## 2023-05-12 NOTE — PLAN
[FreeTextEntry1] : 1. Kenalog 40mg IM once.\par \par 2. Start Doxycycline 100mg PO BID x 10 days.\par \par 3. Start Prednisone 20mg PO BID x 7 days.\par \par 4. F/U with Dr Garcia next week for PFT.

## 2023-05-12 NOTE — REVIEW OF SYSTEMS
[Chills] : no chills [Fever] : no fever [Fatigue] : fatigue [Night Sweats] : no night sweats [Discharge] : no discharge [Redness] : no redness [Dryness] : no dryness [Earache] : no earache [Hearing Loss] : no hearing loss [Nosebleeds] : no nosebleeds [Postnasal Drip] : postnasal drip [Nasal Discharge] : nasal discharge [Sore Throat] : no sore throat [Chest Pain] : no chest pain [Palpitations] : no palpitations [Lower Ext Edema] : no lower extremity edema [Orthopena] : no orthopnea [Shortness Of Breath] : no shortness of breath [Wheezing] : wheezing [Cough] : cough [Dyspnea on Exertion] : not dyspnea on exertion [Nausea] : no nausea [Diarrhea] : no diarrhea [Vomiting] : no vomiting [Joint Pain] : no joint pain [Skin Rash] : no skin rash [Headache] : no headache [Dizziness] : no dizziness [Fainting] : no fainting [Swollen Glands] : no swollen glands

## 2023-05-12 NOTE — HISTORY OF PRESENT ILLNESS
[FreeTextEntry8] : 68M w/ PMHX significant for asthma presents to office today c/o cough productive of yellow sputum, wheezing, nasal congestion and post nasal drip x 7 days. He has had to use his rescue inhaler a few times in the last week without much improvement. He denies fever or chills, or any other constitutional symptoms. His 2 year old grandson recently was sick.

## 2023-05-16 ENCOUNTER — APPOINTMENT (OUTPATIENT)
Dept: INTERNAL MEDICINE | Facility: CLINIC | Age: 68
End: 2023-05-16
Payer: MEDICARE

## 2023-05-16 VITALS
TEMPERATURE: 97.8 F | SYSTOLIC BLOOD PRESSURE: 128 MMHG | HEIGHT: 70 IN | RESPIRATION RATE: 16 BRPM | BODY MASS INDEX: 28.35 KG/M2 | DIASTOLIC BLOOD PRESSURE: 74 MMHG | OXYGEN SATURATION: 97 % | HEART RATE: 78 BPM | WEIGHT: 198 LBS

## 2023-05-16 DIAGNOSIS — J45.909 UNSPECIFIED ASTHMA, UNCOMPLICATED: ICD-10-CM

## 2023-05-16 PROCEDURE — 94729 DIFFUSING CAPACITY: CPT

## 2023-05-16 PROCEDURE — ZZZZZ: CPT

## 2023-05-16 PROCEDURE — 94060 EVALUATION OF WHEEZING: CPT

## 2023-05-16 PROCEDURE — 94727 GAS DIL/WSHOT DETER LNG VOL: CPT

## 2023-05-16 PROCEDURE — 99215 OFFICE O/P EST HI 40 MIN: CPT | Mod: 25

## 2023-05-16 RX ORDER — DIAZEPAM 5 MG/1
5 TABLET ORAL
Qty: 60 | Refills: 0 | Status: DISCONTINUED | COMMUNITY
Start: 2017-06-16 | End: 2023-05-16

## 2023-05-16 RX ORDER — KETOCONAZOLE 20 MG/G
2 CREAM TOPICAL TWICE DAILY
Qty: 1 | Refills: 2 | Status: DISCONTINUED | COMMUNITY
Start: 2022-08-23 | End: 2023-05-16

## 2023-05-16 RX ORDER — PREDNISONE 20 MG/1
20 TABLET ORAL 3 TIMES DAILY
Qty: 21 | Refills: 0 | Status: DISCONTINUED | COMMUNITY
Start: 2023-02-23 | End: 2023-05-16

## 2023-05-16 NOTE — HISTORY OF PRESENT ILLNESS
[FreeTextEntry1] : The patient comes in for a yearly pulmonary evaluation.  [de-identified] : The patient feels relatively well at this time. He has a history of mild persistent asthma and is maintained on Arnuity 200 mcg/act once per day. He was seen by MEHNAZ Murphy in February for a productive cough with brown sputum and nasal congestion. He was using his rescue inhaler at that time. He was prescribed zithromax for 7 days and prednisone for 14 days and his symptoms resolved. \par \par He was seen again by MEHNAZ Murphy on May 12th of this year. He presented with post nasal drip, cough, wheeze and nasal congestion. He was exposed to his sick grandson. He was given a Kenalog 40 injection, prednisone 40 for 7 days and doxycycline for 10 days. He reports that he is doing much better.  He is taking his medications as prescribed.  His cough has lessened and he denies any wheeze or SOB.  He currently does no form of formal exercise although he is active. \par \par He is up to date on his vaccinations.  He thinks that he may have mild allergy mediated symptoms.  He did have "hayfever" as a child.  The patient denies any other constitutional symptoms at this time. He now comes in for this assessment.

## 2023-05-16 NOTE — PHYSICAL EXAM
[No CVA Tenderness] : no CVA  tenderness [No Spinal Tenderness] : no spinal tenderness [No Joint Swelling] : no joint swelling [No Rash] : no rash [Normal Gait] : normal gait [Coordination Grossly Intact] : coordination grossly intact [Normal Affect] : the affect was normal [Normal Insight/Judgement] : insight and judgment were intact [General Appearance - Alert] : alert [General Appearance - In No Acute Distress] : in no acute distress [General Appearance - Well Developed] : well developed [General Appearance - Well-Appearing] : healthy appearing [Outer Ear] : the ears and nose were normal in appearance [Hearing Threshold Finger Rub Not [place]] : hearing was normal [Examination Of The Oral Cavity] : the lips and gums were normal [Nasal Cavity] : the nasal mucosa and septum were normal [Oropharynx] : the oropharynx was normal [Neck Appearance] : the appearance of the neck was normal [Neck Cervical Mass (___cm)] : no neck mass was observed [Jugular Venous Distention Increased] : there was no jugular-venous distention [Thyroid Diffuse Enlargement] : the thyroid was not enlarged [] : no respiratory distress [Respiration, Rhythm And Depth] : normal respiratory rhythm and effort [Exaggerated Use Of Accessory Muscles For Inspiration] : no accessory muscle use [Auscultation Breath Sounds / Voice Sounds] : lungs were clear to auscultation bilaterally [Heart Rate And Rhythm] : heart rate was normal and rhythm regular [Heart Sounds] : normal S1 and S2 [Heart Sounds Gallop] : no gallops [Heart Sounds Pericardial Friction Rub] : no pericardial rub [Edema] : there was no peripheral edema [Veins - Varicosity Changes] : there were no varicosital changes [Abdomen Soft] : soft [Cervical Lymph Nodes Enlarged Anterior Bilaterally] : anterior cervical [Supraclavicular Lymph Nodes Enlarged Bilaterally] : supraclavicular [Abnormal Walk] : normal gait [Nail Clubbing] : no clubbing  or cyanosis of the fingernails [Musculoskeletal - Swelling] : no joint swelling seen [FreeTextEntry1] : obese

## 2023-05-16 NOTE — ADDENDUM
[FreeTextEntry1] : I, Sigifredo Roy, documented this note as a scribe on behalf of Dr. Se Garcia MD on 05/16/2023.

## 2023-05-16 NOTE — PLAN
[FreeTextEntry1] : 1. Continue current medications as previously outlined. \par \par 2. Routine medical follow-up with PCP Dr. Boxer for all other medical needs. \par \par 3. Stressed importance of cardiovascular exercise. \par \par 4. Patient will continue taking prednisone 40 mg and doxycycline as previously prescribed by MEHNAZ Murphy, for this acute exacerbation of his underlying asthma. \par \par 5. Follow-up in 6 months for office visit with pre-post spirometry and FeNO.

## 2023-05-16 NOTE — DATA REVIEWED
[FreeTextEntry1] : Pulmonary function test is performed.  Lung volumes are within normal limits.  Lung mechanics reveal normal flow rates.  Mild to moderate bronchodilator reactivity is demonstrated.  The DLCO and saturation are maintained.  This represents fairly normal physiologic function with airway reactivity.

## 2023-05-23 ENCOUNTER — NON-APPOINTMENT (OUTPATIENT)
Age: 68
End: 2023-05-23

## 2023-05-23 ENCOUNTER — APPOINTMENT (OUTPATIENT)
Dept: INTERNAL MEDICINE | Facility: CLINIC | Age: 68
End: 2023-05-23
Payer: MEDICARE

## 2023-05-23 VITALS
RESPIRATION RATE: 16 BRPM | DIASTOLIC BLOOD PRESSURE: 86 MMHG | WEIGHT: 192 LBS | SYSTOLIC BLOOD PRESSURE: 124 MMHG | HEART RATE: 65 BPM | HEIGHT: 70 IN | OXYGEN SATURATION: 96 % | BODY MASS INDEX: 27.49 KG/M2 | TEMPERATURE: 97.8 F

## 2023-05-23 DIAGNOSIS — J45.909 UNSPECIFIED ASTHMA, UNCOMPLICATED: ICD-10-CM

## 2023-05-23 PROCEDURE — 99214 OFFICE O/P EST MOD 30 MIN: CPT | Mod: 25

## 2023-05-23 PROCEDURE — 94060 EVALUATION OF WHEEZING: CPT

## 2023-05-23 RX ORDER — PREDNISONE 20 MG/1
20 TABLET ORAL
Qty: 19 | Refills: 0 | Status: DISCONTINUED | COMMUNITY
Start: 2022-11-07 | End: 2023-05-23

## 2023-05-23 RX ORDER — DOXYCYCLINE HYCLATE 100 MG/1
100 TABLET ORAL
Qty: 20 | Refills: 0 | Status: DISCONTINUED | COMMUNITY
Start: 2023-05-12 | End: 2023-05-23

## 2023-05-23 RX ORDER — PREDNISONE 20 MG/1
20 TABLET ORAL TWICE DAILY
Qty: 14 | Refills: 0 | Status: DISCONTINUED | COMMUNITY
Start: 2023-05-12 | End: 2023-05-23

## 2023-05-23 RX ORDER — BENZONATATE 100 MG/1
100 CAPSULE ORAL 4 TIMES DAILY
Qty: 80 | Refills: 11 | Status: DISCONTINUED | COMMUNITY
Start: 2022-11-07 | End: 2023-05-23

## 2023-05-23 RX ORDER — AZITHROMYCIN 500 MG/1
500 TABLET, FILM COATED ORAL DAILY
Qty: 7 | Refills: 0 | Status: DISCONTINUED | COMMUNITY
Start: 2023-02-23 | End: 2023-05-23

## 2023-05-23 NOTE — PHYSICAL EXAM
[No Acute Distress] : no acute distress [No Respiratory Distress] : no respiratory distress  [No Accessory Muscle Use] : no accessory muscle use [Clear to Auscultation] : lungs were clear to auscultation bilaterally [Normal Rate] : normal rate  [Regular Rhythm] : with a regular rhythm [Normal S1, S2] : normal S1 and S2 [No Edema] : there was no peripheral edema [Normal Affect] : the affect was normal [Alert and Oriented x3] : oriented to person, place, and time

## 2023-05-23 NOTE — PLAN
[FreeTextEntry1] : 1. Patient will start Singulair qd in addition to Claritin daily\par \par 2. Azelastine BID to nose\par \par 3. Eobicxfvl-[id number] as needed for cough\par \par 4. Notify if no improvement

## 2023-05-23 NOTE — HISTORY OF PRESENT ILLNESS
[FreeTextEntry8] : Patient is a 68- year- old male with significant for history of asthma who presents to office today c/o cough productive of yellow sputum, wheezing, nasal congestion and post nasal drip x 7 days. Patient was seen initially 5/12/23 NP Jeffrey and tx with Kenalog 40mg IM, Doxycycline 100mg PO BID x 10 days, Prednisone 20mg PO BID x 7 days. He then followed up in the office 5/16/23 for follow up and felt he was doing better. He states his cough has returned, mostly worse at night. Has been doing outdoor activities, feels PND. No other associated symptoms/ complaints.\par \par

## 2023-06-05 ENCOUNTER — RX RENEWAL (OUTPATIENT)
Age: 68
End: 2023-06-05

## 2023-06-05 RX ORDER — ALBUTEROL SULFATE 90 UG/1
108 (90 BASE) INHALANT RESPIRATORY (INHALATION)
Qty: 6.7 | Refills: 1 | Status: ACTIVE | COMMUNITY
Start: 2017-10-06 | End: 1900-01-01

## 2023-07-13 ENCOUNTER — EMERGENCY (EMERGENCY)
Facility: HOSPITAL | Age: 68
LOS: 0 days | Discharge: HOME | End: 2023-07-13
Attending: EMERGENCY MEDICINE
Payer: MEDICARE

## 2023-07-13 VITALS
SYSTOLIC BLOOD PRESSURE: 105 MMHG | HEART RATE: 77 BPM | DIASTOLIC BLOOD PRESSURE: 59 MMHG | OXYGEN SATURATION: 98 % | RESPIRATION RATE: 18 BRPM | TEMPERATURE: 98 F

## 2023-07-13 VITALS — WEIGHT: 195.11 LBS | HEIGHT: 71 IN

## 2023-07-13 DIAGNOSIS — R50.9 FEVER, UNSPECIFIED: ICD-10-CM

## 2023-07-13 DIAGNOSIS — Z91.030 BEE ALLERGY STATUS: ICD-10-CM

## 2023-07-13 DIAGNOSIS — F41.9 ANXIETY DISORDER, UNSPECIFIED: ICD-10-CM

## 2023-07-13 DIAGNOSIS — E78.5 HYPERLIPIDEMIA, UNSPECIFIED: ICD-10-CM

## 2023-07-13 DIAGNOSIS — B34.1 ENTEROVIRUS INFECTION, UNSPECIFIED: ICD-10-CM

## 2023-07-13 DIAGNOSIS — Z87.19 PERSONAL HISTORY OF OTHER DISEASES OF THE DIGESTIVE SYSTEM: ICD-10-CM

## 2023-07-13 DIAGNOSIS — Z20.822 CONTACT WITH AND (SUSPECTED) EXPOSURE TO COVID-19: ICD-10-CM

## 2023-07-13 LAB
ALBUMIN SERPL ELPH-MCNC: 3.9 G/DL — SIGNIFICANT CHANGE UP (ref 3.3–5)
ALP SERPL-CCNC: 40 U/L — SIGNIFICANT CHANGE UP (ref 40–120)
ALT FLD-CCNC: 69 U/L — SIGNIFICANT CHANGE UP (ref 12–78)
ANION GAP SERPL CALC-SCNC: 4 MMOL/L — LOW (ref 5–17)
APPEARANCE UR: CLEAR — SIGNIFICANT CHANGE UP
AST SERPL-CCNC: 56 U/L — HIGH (ref 15–37)
BACTERIA # UR AUTO: NEGATIVE — SIGNIFICANT CHANGE UP
BASOPHILS # BLD AUTO: 0.03 K/UL — SIGNIFICANT CHANGE UP (ref 0–0.2)
BASOPHILS NFR BLD AUTO: 0.6 % — SIGNIFICANT CHANGE UP (ref 0–2)
BILIRUB SERPL-MCNC: 0.7 MG/DL — SIGNIFICANT CHANGE UP (ref 0.2–1.2)
BILIRUB UR-MCNC: NEGATIVE — SIGNIFICANT CHANGE UP
BUN SERPL-MCNC: 20 MG/DL — SIGNIFICANT CHANGE UP (ref 7–23)
CALCIUM SERPL-MCNC: 9.6 MG/DL — SIGNIFICANT CHANGE UP (ref 8.5–10.1)
CHLORIDE SERPL-SCNC: 108 MMOL/L — SIGNIFICANT CHANGE UP (ref 96–108)
CO2 SERPL-SCNC: 26 MMOL/L — SIGNIFICANT CHANGE UP (ref 22–31)
COLOR SPEC: YELLOW — SIGNIFICANT CHANGE UP
COMMENT - URINE: SIGNIFICANT CHANGE UP
CREAT SERPL-MCNC: 1.26 MG/DL — SIGNIFICANT CHANGE UP (ref 0.5–1.3)
DIFF PNL FLD: ABNORMAL
EGFR: 61 ML/MIN/1.73M2 — SIGNIFICANT CHANGE UP
EOSINOPHIL # BLD AUTO: 0.03 K/UL — SIGNIFICANT CHANGE UP (ref 0–0.5)
EOSINOPHIL NFR BLD AUTO: 0.6 % — SIGNIFICANT CHANGE UP (ref 0–6)
EPI CELLS # UR: NEGATIVE — SIGNIFICANT CHANGE UP
FLUAV AG NPH QL: SIGNIFICANT CHANGE UP
FLUBV AG NPH QL: SIGNIFICANT CHANGE UP
GLUCOSE SERPL-MCNC: 116 MG/DL — HIGH (ref 70–99)
GLUCOSE UR QL: NEGATIVE — SIGNIFICANT CHANGE UP
HCT VFR BLD CALC: 42 % — SIGNIFICANT CHANGE UP (ref 39–50)
HGB BLD-MCNC: 14.2 G/DL — SIGNIFICANT CHANGE UP (ref 13–17)
IMM GRANULOCYTES NFR BLD AUTO: 1.1 % — HIGH (ref 0–0.9)
KETONES UR-MCNC: NEGATIVE — SIGNIFICANT CHANGE UP
LACTATE SERPL-SCNC: 1.9 MMOL/L — SIGNIFICANT CHANGE UP (ref 0.7–2)
LEUKOCYTE ESTERASE UR-ACNC: NEGATIVE — SIGNIFICANT CHANGE UP
LYMPHOCYTES # BLD AUTO: 0.61 K/UL — LOW (ref 1–3.3)
LYMPHOCYTES # BLD AUTO: 12.9 % — LOW (ref 13–44)
MCHC RBC-ENTMCNC: 29.8 PG — SIGNIFICANT CHANGE UP (ref 27–34)
MCHC RBC-ENTMCNC: 33.8 GM/DL — SIGNIFICANT CHANGE UP (ref 32–36)
MCV RBC AUTO: 88.1 FL — SIGNIFICANT CHANGE UP (ref 80–100)
MONOCYTES # BLD AUTO: 0.35 K/UL — SIGNIFICANT CHANGE UP (ref 0–0.9)
MONOCYTES NFR BLD AUTO: 7.4 % — SIGNIFICANT CHANGE UP (ref 2–14)
NEUTROPHILS # BLD AUTO: 3.65 K/UL — SIGNIFICANT CHANGE UP (ref 1.8–7.4)
NEUTROPHILS NFR BLD AUTO: 77.4 % — HIGH (ref 43–77)
NITRITE UR-MCNC: NEGATIVE — SIGNIFICANT CHANGE UP
PH UR: 5 — SIGNIFICANT CHANGE UP (ref 5–8)
PLATELET # BLD AUTO: 192 K/UL — SIGNIFICANT CHANGE UP (ref 150–400)
POTASSIUM SERPL-MCNC: 3.8 MMOL/L — SIGNIFICANT CHANGE UP (ref 3.5–5.3)
POTASSIUM SERPL-SCNC: 3.8 MMOL/L — SIGNIFICANT CHANGE UP (ref 3.5–5.3)
PROT SERPL-MCNC: 7.5 GM/DL — SIGNIFICANT CHANGE UP (ref 6–8.3)
PROT UR-MCNC: 15
RBC # BLD: 4.77 M/UL — SIGNIFICANT CHANGE UP (ref 4.2–5.8)
RBC # FLD: 13.3 % — SIGNIFICANT CHANGE UP (ref 10.3–14.5)
RBC CASTS # UR COMP ASSIST: SIGNIFICANT CHANGE UP /HPF (ref 0–4)
RSV RNA NPH QL NAA+NON-PROBE: SIGNIFICANT CHANGE UP
SARS-COV-2 RNA SPEC QL NAA+PROBE: SIGNIFICANT CHANGE UP
SODIUM SERPL-SCNC: 138 MMOL/L — SIGNIFICANT CHANGE UP (ref 135–145)
SP GR SPEC: 1.01 — SIGNIFICANT CHANGE UP (ref 1.01–1.02)
UROBILINOGEN FLD QL: NEGATIVE — SIGNIFICANT CHANGE UP
WBC # BLD: 4.72 K/UL — SIGNIFICANT CHANGE UP (ref 3.8–10.5)
WBC # FLD AUTO: 4.72 K/UL — SIGNIFICANT CHANGE UP (ref 3.8–10.5)
WBC UR QL: NEGATIVE /HPF — SIGNIFICANT CHANGE UP (ref 0–5)

## 2023-07-13 PROCEDURE — 87468 ANAPLSMA PHGCYTOPHLM AMP PRB: CPT

## 2023-07-13 PROCEDURE — 80053 COMPREHEN METABOLIC PANEL: CPT

## 2023-07-13 PROCEDURE — 87086 URINE CULTURE/COLONY COUNT: CPT

## 2023-07-13 PROCEDURE — 83605 ASSAY OF LACTIC ACID: CPT

## 2023-07-13 PROCEDURE — 0241U: CPT

## 2023-07-13 PROCEDURE — 71046 X-RAY EXAM CHEST 2 VIEWS: CPT | Mod: 26

## 2023-07-13 PROCEDURE — 36415 COLL VENOUS BLD VENIPUNCTURE: CPT

## 2023-07-13 PROCEDURE — 86618 LYME DISEASE ANTIBODY: CPT

## 2023-07-13 PROCEDURE — 85025 COMPLETE CBC W/AUTO DIFF WBC: CPT

## 2023-07-13 PROCEDURE — 81001 URINALYSIS AUTO W/SCOPE: CPT

## 2023-07-13 PROCEDURE — 71046 X-RAY EXAM CHEST 2 VIEWS: CPT

## 2023-07-13 PROCEDURE — 87476 LYME DIS DNA AMP PROBE: CPT

## 2023-07-13 PROCEDURE — 99285 EMERGENCY DEPT VISIT HI MDM: CPT | Mod: FS

## 2023-07-13 PROCEDURE — 87484 EHRLICHA CHAFFEENSIS AMP PRB: CPT

## 2023-07-13 PROCEDURE — 87798 DETECT AGENT NOS DNA AMP: CPT

## 2023-07-13 PROCEDURE — 87040 BLOOD CULTURE FOR BACTERIA: CPT | Mod: 91

## 2023-07-13 PROCEDURE — 99283 EMERGENCY DEPT VISIT LOW MDM: CPT | Mod: 25

## 2023-07-13 RX ORDER — SODIUM CHLORIDE 9 MG/ML
1000 INJECTION INTRAMUSCULAR; INTRAVENOUS; SUBCUTANEOUS ONCE
Refills: 0 | Status: COMPLETED | OUTPATIENT
Start: 2023-07-13 | End: 2023-07-13

## 2023-07-13 RX ADMIN — SODIUM CHLORIDE 2000 MILLILITER(S): 9 INJECTION INTRAMUSCULAR; INTRAVENOUS; SUBCUTANEOUS at 19:22

## 2023-07-13 NOTE — ED STATDOCS - NSFOLLOWUPINSTRUCTIONS_ED_ALL_ED_FT
Follow up with your pmd within 48 hours- show copies of ER results   Return to the ED if any worsening or persistent symptoms         Hand, Foot, and Mouth Disease    WHAT YOU NEED TO KNOW:    What is hand, foot, and mouth disease (HFMD)? Hand, foot, and mouth disease (HFMD) is an infection caused by a virus. HFMD is easily spread from person to person through direct contact. Anyone can get HFMD, but it is most common in children younger than 5 years.  Hand Foot Mouth Disease    What are the signs and symptoms of HFMD? The following may appear 3 to 7 days after infection and normally go away within 7 to 10 days:    Fever    Sore throat    Lack of appetite    A rash, sores, or painful red blisters in or around the mouth, throat, hands, feet, or diaper area  How is HFMD diagnosed? Your healthcare provider can usually diagnose HFMD by examining you. Tell him or her if you have been near anyone who has HFMD. A provider may also swab your throat or nose, or collect a sample of your bowel movement. These samples will be sent to a lab to test for a virus that causes HFMD.    How is HFMD treated? HFMD usually goes away on its own without treatment. The following can help you feel more comfortable until your symptoms go away:    Drink extra liquids, as directed. Liquid will hep prevent dehydration. Ask your healthcare provider how much liquid to drink each day, and which liquids are best for you.    Have foods and liquids that are easy to swallow. Examples include cold foods such as popsicles, smoothies, or ice cream. Do not have sodas, hot drinks, or acidic foods such as tomato sauce or orange juice.    Medicines may help decrease a fever or pain. Your healthcare provider will tell you which medicines to use, and how long to use them. Do not give aspirin to children younger than 18 years. Aspirin can cause a life-threatening condition called Reye syndrome.  How do I prevent the spread of HFMD? You can spread the virus for weeks after your symptoms have gone away. The following can help prevent the spread of HFMD:    Wash your hands often. Use soap and water. Wash your hands after you use the bathroom, change a child's diapers, or sneeze. Wash your hands before you prepare or eat food.  Handwashing      Stay home from work or school while you have a fever or open blisters. Do not kiss, hug, or share food or drinks.    Wash all items and surfaces with diluted bleach. This includes toys, tables, counter tops, and door knobs.  When should I seek immediate care?    You have trouble breathing, are breathing very fast, or you cough up pink, foamy spit.    You have a high fever and your heart is beating much faster than it usually does.    You have a severe headache, stiff neck, and back pain.    You become confused and sleepy.    You have trouble moving, or cannot move part of your body.    You urinate less than normal or not at all.  When should I call my doctor?    Your mouth or throat are so sore you cannot eat or drink.    Your fever, sore throat, mouth sores, or rash do not go away after 10 days.    You have questions or concerns about your condition or care.  CARE AGREEMENT:    You have the right to help plan your care. Learn about your health condition and how it may be treated. Discuss treatment options with your healthcare providers to decide what care you want to receive. You always have the right to refuse treatment.    © [employer] 1973, 2023    	  back to top            © [employer] 1973, 2023

## 2023-07-13 NOTE — ED STATDOCS - NSICDXPASTMEDICALHX_GEN_ALL_CORE_FT
PAST MEDICAL HISTORY:  Anxiety     Anxiety     Asthma     Asthma     GERD (gastroesophageal reflux disease)     GERD (gastroesophageal reflux disease)     Glaucoma     Glaucoma     HLD (hyperlipidemia)     HLD (hyperlipidemia)

## 2023-07-13 NOTE — ED ADULT NURSE NOTE - OBJECTIVE STATEMENT
pt has fevers since Wednesday, managing with ibuprofen. no recent travel, no sick contacts. general malaise and post nasal drip. well appearing

## 2023-07-13 NOTE — ED ADULT NURSE NOTE - NSFALLUNIVINTERV_ED_ALL_ED
Bed/Stretcher in lowest position, wheels locked, appropriate side rails in place/Call bell, personal items and telephone in reach/Instruct patient to call for assistance before getting out of bed/chair/stretcher/Non-slip footwear applied when patient is off stretcher/[place] to call system/Physically safe environment - no spills, clutter or unnecessary equipment/Purposeful proactive rounding/Room/bathroom lighting operational, light cord in reach

## 2023-07-13 NOTE — ED STATDOCS - PATIENT PORTAL LINK FT
You can access the FollowMyHealth Patient Portal offered by [facility] by registering at the following website: [email or web address]. By joining [employer]’s FollowMyHealth portal, you will also be able to view your health information using other applications (apps) compatible with our system.

## 2023-07-13 NOTE — ED ADULT NURSE NOTE - PRIMARY CARE PROVIDER
1 mL testosterone injection given. Verbal order for injection from Dr Monteiro. Patient aware to return to clinic in 2 weeks for next injection. Patient tolerated without incident. See MAR for documentation.      
see md note

## 2023-07-13 NOTE — ED ADULT TRIAGE NOTE - CHIEF COMPLAINT QUOTE
Patient presents to the ER with complaints of fever and dehydration. Patient reports being outside since Wed doing yard work, playing golf when he felt feverish (max temp 102) at night, chills, and fatigued.

## 2023-07-13 NOTE — ED STATDOCS - SKIN, MLM
skin normal color for race, warm, dry and intact. Brii Mccauley)  Orthopaedic Surgery  [address] [place] [address], [address] [place] [address]  Phone: [phone]  Fax: [phone]  Follow Up Time:

## 2023-07-13 NOTE — ED STATDOCS - OBJECTIVE STATEMENT
68 year old male presents to the ED c/o fever tmax 104 +chills. States he was out mowing his son's law and played golf all day, went home and developed chills, dehydration, and fever of 104. Patient also reports recent tick bite a few weeks ago. Patient took motrin PTA.

## 2023-07-13 NOTE — ED STATDOCS - ATTENDING APP SHARED VISIT CONTRIBUTION OF CARE
I, Jackelyn Langston MD,  performed the initial face to face bedside interview with this patient regarding history of present illness, review of symptoms and relevant past medical, social and family history.  I completed an independent physical examination.  I was the initial provider who evaluated this patient.   I personally saw the patient and performed a substantive portion of the visit including all aspects of the medical decision making.  I have signed out the follow up of any pending tests (i.e. labs, radiological studies) to the JOHN.  I have communicated the patient’s plan of care and disposition with the JOHN.  The history, relevant review of systems, past medical and surgical history, medical decision making, and physical examination was documented by the scribe in my presence and I attest to the accuracy of the documentation.

## 2023-07-13 NOTE — ED STATDOCS - PROGRESS NOTE DETAILS
ROCIO Jean: I participated in the care of this patient. I agree with the history, physical and plan.  awaiting labs, imaging. will continue to monitor. ROCIO Jean: labs and imaging reviewed. Pt stable for dc and will follow up with pmd.

## 2023-07-14 LAB
B BURGDOR C6 AB SER-ACNC: NEGATIVE — SIGNIFICANT CHANGE UP
B BURGDOR IGG+IGM SER QL IB: SIGNIFICANT CHANGE UP
B BURGDOR IGG+IGM SER-ACNC: 0.18 INDEX — SIGNIFICANT CHANGE UP (ref 0.01–0.89)
BABESIA MICROTI PCR, BLD RESULT: SIGNIFICANT CHANGE UP
CULTURE RESULTS: SIGNIFICANT CHANGE UP
SPECIMEN SOURCE: SIGNIFICANT CHANGE UP

## 2023-07-15 LAB
A PHAGOCYTOPH DNA BLD QL NAA+PROBE: NEGATIVE — SIGNIFICANT CHANGE UP
E CHAFFEENSIS DNA BLD QL NAA+PROBE: NEGATIVE — SIGNIFICANT CHANGE UP
E EWINGII DNA SPEC QL NAA+PROBE: NEGATIVE — SIGNIFICANT CHANGE UP
EHRLICHIA DNA SPEC QL NAA+PROBE: NEGATIVE — SIGNIFICANT CHANGE UP

## 2023-07-17 ENCOUNTER — EMERGENCY (EMERGENCY)
Facility: HOSPITAL | Age: 68
LOS: 0 days | Discharge: HOME | End: 2023-07-17
Attending: STUDENT IN AN ORGANIZED HEALTH CARE EDUCATION/TRAINING PROGRAM
Payer: MEDICARE

## 2023-07-17 VITALS
HEIGHT: 71 IN | TEMPERATURE: 98 F | SYSTOLIC BLOOD PRESSURE: 110 MMHG | OXYGEN SATURATION: 100 % | HEART RATE: 79 BPM | WEIGHT: 195.11 LBS | RESPIRATION RATE: 18 BRPM | DIASTOLIC BLOOD PRESSURE: 77 MMHG

## 2023-07-17 VITALS
HEART RATE: 65 BPM | TEMPERATURE: 98 F | OXYGEN SATURATION: 95 % | SYSTOLIC BLOOD PRESSURE: 124 MMHG | DIASTOLIC BLOOD PRESSURE: 59 MMHG | RESPIRATION RATE: 18 BRPM

## 2023-07-17 DIAGNOSIS — E78.5 HYPERLIPIDEMIA, UNSPECIFIED: ICD-10-CM

## 2023-07-17 DIAGNOSIS — Z91.030 BEE ALLERGY STATUS: ICD-10-CM

## 2023-07-17 DIAGNOSIS — F41.9 ANXIETY DISORDER, UNSPECIFIED: ICD-10-CM

## 2023-07-17 DIAGNOSIS — T63.441A TOXIC EFFECT OF VENOM OF BEES, ACCIDENTAL (UNINTENTIONAL), INITIAL ENCOUNTER: ICD-10-CM

## 2023-07-17 DIAGNOSIS — Z87.19 PERSONAL HISTORY OF OTHER DISEASES OF THE DIGESTIVE SYSTEM: ICD-10-CM

## 2023-07-17 PROCEDURE — 99284 EMERGENCY DEPT VISIT MOD MDM: CPT

## 2023-07-17 PROCEDURE — 99285 EMERGENCY DEPT VISIT HI MDM: CPT | Mod: 25

## 2023-07-17 PROCEDURE — 96374 THER/PROPH/DIAG INJ IV PUSH: CPT

## 2023-07-17 PROCEDURE — 96372 THER/PROPH/DIAG INJ SC/IM: CPT | Mod: XU

## 2023-07-17 PROCEDURE — 96375 TX/PRO/DX INJ NEW DRUG ADDON: CPT

## 2023-07-17 RX ORDER — DIPHENHYDRAMINE HCL 50 MG
1 CAPSULE ORAL
Qty: 9 | Refills: 0
Start: 2023-07-17 | End: 2023-07-19

## 2023-07-17 RX ORDER — EPINEPHRINE 0.3 MG/.3ML
0.3 INJECTION INTRAMUSCULAR; SUBCUTANEOUS ONCE
Refills: 0 | Status: COMPLETED | OUTPATIENT
Start: 2023-07-17 | End: 2023-07-17

## 2023-07-17 RX ORDER — EPINEPHRINE 0.3 MG/.3ML
1 INJECTION INTRAMUSCULAR; SUBCUTANEOUS
Qty: 2 | Refills: 0
Start: 2023-07-17

## 2023-07-17 RX ORDER — FAMOTIDINE 10 MG/ML
20 INJECTION INTRAVENOUS ONCE
Refills: 0 | Status: COMPLETED | OUTPATIENT
Start: 2023-07-17 | End: 2023-07-17

## 2023-07-17 RX ADMIN — EPINEPHRINE 0.3 MILLIGRAM(S): 0.3 INJECTION INTRAMUSCULAR; SUBCUTANEOUS at 16:58

## 2023-07-17 RX ADMIN — FAMOTIDINE 40 MILLIGRAM(S): 10 INJECTION INTRAVENOUS at 17:08

## 2023-07-17 RX ADMIN — Medication 125 MILLIGRAM(S): at 16:59

## 2023-07-17 NOTE — ED PROVIDER NOTE - NS ED ROS FT
General: No fever, no nausea, no vomiting  HEENT: No loc, no ha, no dizziness  Respiratory: no trouble breathing  Cardiovascular: No chest pain  GI: No abdominal pain, no diarrhea  : No urinary complaints  Endocrine: no generalized weakness  Neurological: No weakness, numbness  MSK: no recent trauma  Skin: +diffuse rash

## 2023-07-17 NOTE — ED ADULT NURSE NOTE - NSFALLUNIVINTERV_ED_ALL_ED
Bed/Stretcher in lowest position, wheels locked, appropriate side rails in place/Call bell, personal items and telephone in reach/Instruct patient to call for assistance before getting out of bed/chair/stretcher/Non-slip footwear applied when patient is off stretcher/[place] to call system/Physically safe environment - no spills, clutter or unnecessary equipment/Purposeful proactive rounding/Room/bathroom lighting operational, light cord in reach

## 2023-07-17 NOTE — ED PROVIDER NOTE - PATIENT PORTAL LINK FT
You can access the FollowMyHealth Patient Portal offered by [facility] by registering at the following website: [email or web address]. By joining [employer]’s FollowMyHealth portal, you will also be able to view your health information using other applications (apps) compatible with our system.

## 2023-07-17 NOTE — ED ADULT TRIAGE NOTE - LOCATION:
Chief Complaint   Patient presents with   • Follow-up   • Diabetes         History:  Attila Viramontes is a 68 y.o. male who presents today for evaluation of the above problems.      Presents for 6-month follow-up.  He is doing better now.  His A1c is 6.6 on Metformin 1000 g twice a day.    He saw Dr. Reardon yesterday and I reviewed his office note.  He is now back on his Lipitor.  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  At times he has numbness in the right arm, and the left finger tingles at times.    He has had his port removed he needs a pacemaker.  He has been in contact with Dr. Hameed, but the procedure has not been scheduled yet  a1C 6.6, met 1000mg bid    He continues to see Dr. Boggs.  He is not gaining weight and feeling much better    HPI      ROS:  Review of Systems   Constitutional: Positive for activity change. Negative for appetite change, fatigue, fever and unexpected weight change.   Eyes: Negative for pain and visual disturbance.   Respiratory: Negative for cough, chest tightness and shortness of breath.    Cardiovascular: Negative for chest pain, palpitations and leg swelling.   Gastrointestinal: Negative for abdominal distention, abdominal pain, anal bleeding, blood in stool, constipation, diarrhea, nausea and vomiting.        Colostomy and ileostomy   Genitourinary: Negative for hematuria.   Musculoskeletal: Negative for back pain, neck pain and neck stiffness.   Skin: Negative for rash and wound.   Neurological: Positive for numbness. Negative for dizziness, syncope, weakness, light-headedness and headaches.   Hematological: Negative for adenopathy. Does not bruise/bleed easily.   Psychiatric/Behavioral: Negative for agitation, behavioral problems and confusion. The patient is not nervous/anxious.          Current Outpatient Medications:   •  aspirin 81 MG chewable tablet, Chew 81 mg Daily., Disp: , Rfl:   •  atorvastatin (Lipitor) 20 MG tablet,  Take 1 tablet by mouth Daily., Disp: 30 tablet, Rfl: 1  •  ferrous sulfate 325 (65 FE) MG tablet, Take 1 tablet by mouth Daily With Breakfast., Disp: 60 tablet, Rfl: 2  •  metFORMIN (Glucophage) 500 MG tablet, Take 2 tablets by mouth 2 (Two) Times a Day With Meals., Disp: 120 tablet, Rfl: 5    Lab Results   Component Value Date    GLUCOSE 140 (H) 07/02/2021    BUN 25 (H) 07/02/2021    CREATININE 1.30 07/02/2021    EGFRIFNONA 64 07/02/2021    BCR 22.1 07/02/2021    K 4.9 07/02/2021    CO2 25.0 07/02/2021    CALCIUM 9.5 07/02/2021    ALBUMIN 4.00 07/02/2021    AST 12 07/02/2021    ALT 15 07/02/2021       WBC   Date Value Ref Range Status   07/02/2021 8.09 3.40 - 10.80 10*3/mm3 Final     RBC   Date Value Ref Range Status   07/02/2021 4.06 (L) 4.14 - 5.80 10*6/mm3 Final     Hemoglobin   Date Value Ref Range Status   07/02/2021 10.8 (L) 13.0 - 17.7 g/dL Final     Hematocrit   Date Value Ref Range Status   07/02/2021 35.3 (L) 37.5 - 51.0 % Final     MCV   Date Value Ref Range Status   07/02/2021 86.9 79.0 - 97.0 fL Final     MCH   Date Value Ref Range Status   07/02/2021 26.6 26.6 - 33.0 pg Final     MCHC   Date Value Ref Range Status   07/02/2021 30.6 (L) 31.5 - 35.7 g/dL Final     RDW   Date Value Ref Range Status   07/02/2021 21.0 (H) 12.3 - 15.4 % Final     RDW-SD   Date Value Ref Range Status   07/02/2021 66.7 (H) 37.0 - 54.0 fl Final     MPV   Date Value Ref Range Status   07/02/2021 8.2 6.0 - 12.0 fL Final     Platelets   Date Value Ref Range Status   07/02/2021 443 140 - 450 10*3/mm3 Final     Neutrophil %   Date Value Ref Range Status   07/02/2021 73.4 42.7 - 76.0 % Final     Lymphocyte %   Date Value Ref Range Status   07/02/2021 10.4 (L) 19.6 - 45.3 % Final     Monocyte %   Date Value Ref Range Status   07/02/2021 8.8 5.0 - 12.0 % Final     Eosinophil %   Date Value Ref Range Status   07/02/2021 5.2 0.3 - 6.2 % Final     Basophil %   Date Value Ref Range Status   07/02/2021 1.1 0.0 - 1.5 % Final     Immature  "Grans %   Date Value Ref Range Status   07/02/2021 1.1 (H) 0.0 - 0.5 % Final     Neutrophils, Absolute   Date Value Ref Range Status   07/02/2021 5.94 1.70 - 7.00 10*3/mm3 Final     Lymphocytes, Absolute   Date Value Ref Range Status   07/02/2021 0.84 0.70 - 3.10 10*3/mm3 Final     Monocytes, Absolute   Date Value Ref Range Status   07/02/2021 0.71 0.10 - 0.90 10*3/mm3 Final     Eosinophils, Absolute   Date Value Ref Range Status   07/02/2021 0.42 (H) 0.00 - 0.40 10*3/mm3 Final     Basophils, Absolute   Date Value Ref Range Status   07/02/2021 0.09 0.00 - 0.20 10*3/mm3 Final     Immature Grans, Absolute   Date Value Ref Range Status   07/02/2021 0.09 (H) 0.00 - 0.05 10*3/mm3 Final     nRBC   Date Value Ref Range Status   07/02/2021 0.0 0.0 - 0.2 /100 WBC Final         OBJECTIVE:  Visit Vitals  /60 (BP Location: Left arm, Patient Position: Sitting, Cuff Size: Adult)   Pulse 105   Temp 97.5 °F (36.4 °C) (Oral)   Resp 15   Ht 179.1 cm (70.51\")   Wt 76.1 kg (167 lb 11.2 oz)   SpO2 99%   BMI 23.71 kg/m²      Physical Exam  Vitals reviewed.   Constitutional:       General: He is not in acute distress.     Appearance: Normal appearance. He is well-developed.      Comments: Pleasant   HENT:      Head: Normocephalic and atraumatic.   Eyes:      Pupils: Pupils are equal, round, and reactive to light.   Neck:      Thyroid: No thyromegaly.      Trachea: Phonation normal.   Cardiovascular:      Rate and Rhythm: Normal rate and regular rhythm.   Pulmonary:      Effort: No respiratory distress.   Skin:     Coloration: Skin is not pale.      Findings: No erythema.   Neurological:      Mental Status: He is alert.   Psychiatric:         Behavior: Behavior normal.         Thought Content: Thought content normal.         Judgment: Judgment normal.         Assessment/Plan    Diagnoses and all orders for this visit:    1. Type 2 diabetes mellitus without complication, without long-term current use of insulin (CMS/[facility]) (Primary)  -  "    POC Glycosylated Hemoglobin (Hb A1C)    2. Mixed hyperlipidemia    3. 2nd degree AV block      Diabetes is well controlled.  A1c 6.6.  Continue Metformin.    He was recently placed back on his statin.  We will continue to follow his levels.    He is going to get back with Dr. Hameed regarding when to schedule his permanent pacemaker placement.    He is fully vaccinated against COVID-19.    Follow-up in 6 months for his Medicare wellness visit, or sooner if clinically indicated.    Return in about 6 months (around 3/22/2022) for Medicare Wellness after 3/22/22.      KIP Hebert MD  09:35 CDT  9/22/2021    Left arm;

## 2023-07-17 NOTE — ED PROVIDER NOTE - PHYSICAL EXAMINATION
General: Patient in no acute distress, AAOX3.   HENMT: NC/AT, no nasal congestion, MMM. no pharyngeal and uvular edema  Neck: supple  CVS: regular rate and rhythm, no murmur  Resp: Good air entry bilaterally, No wheeze/rhonchi.  Abd: Soft non tender, non distended, +bowel sounds, No guarding, rebound tenderness   Ext: FROM in all ext, 2+ pulses throughout, cap refill<2 sec.  BACK: no midline tenderness  NEURO: no focal deficit, gross motor and sensory intact throughout, gait stable.  SKIN: +diffuse macular papular rash General: Patient in no acute distress, AAOX3.   HENMT: NC/AT, no nasal congestion, MMM. no pharyngeal and uvular edema  Neck: supple  CVS: regular rate and rhythm, no murmur  Resp: Good air entry bilaterally, No wheeze/rhonchi.  Abd: Soft non tender, non distended, +bowel sounds, No guarding, rebound tenderness   Ext: FROM in all ext, 2+ pulses throughout, cap refill<2 sec.  BACK: no midline tenderness  NEURO: no focal deficit, gross motor and sensory intact throughout, gait stable.  SKIN: +diffuse macular papular rash, b/l UE, back/chest

## 2023-07-17 NOTE — ED PROVIDER NOTE - OBJECTIVE STATEMENT
67 y/o male w/PMHx of HLD, glaucoma, anxiety, asthma, GERD BIBEMS s/p allergic reaction after being stung by a yellow-jacket bee approx. 1hr ago on his upper posterior b/l arms and back. afterward pt endorsed diffuse itchiness/redness and increased rapsiness of voice secondary to throat tightness. adds that he experienced abdominal tightness s/p bite. denies SOB, n/v. 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 took 50mg of benadryl and 1 pepcid at home prior to EMS arrival. no epi pen use. denies any numbness, tingling, or weakness to extremities.

## 2023-07-17 NOTE — ED PROVIDER NOTE - NSFOLLOWUPINSTRUCTIONS_ED_ALL_ED_FT
Patient advised to take meds as prescribed and follow up with PMD/ for follow up in 3-4 days.  patient and her  understands and agrees with plan.  Return to ER if symptoms worsens or develop new symptoms.   red flags ddiscussed              Anaphylaxis    WHAT YOU NEED TO KNOW:    What is anaphylaxis? Anaphylaxis is a life-threatening allergic reaction that must be treated immediately. Your risk for anaphylaxis increases if you have asthma that is severe or not controlled. Medical conditions such as heart disease can also increase your risk. It is important to be prepared if you are at risk for anaphylaxis. Your symptoms can be worse each time you are exposed to the trigger.    What may trigger anaphylaxis? The following are some of the most common triggers:    Milk, peanuts, tree nuts, eggs, shellfish, wheat, and soy    Stings from bees, wasps, or fire ants    Antibiotics, NSAIDs, or aspirin    Latex    Exercise following exposure to another trigger, such as after you eat a trigger food  What are the signs and symptoms of anaphylaxis? You may have any of the following within seconds to hours after exposure to a trigger:    Trouble breathing, shortness of breath, wheezing, or coughing    Throat tightening, swelling of the lips or tongue, or trouble swallowing    Rash, hives, swelling, or itching    Nausea, vomiting, diarrhea, or abdominal cramps    Dizziness, lightheadedness, fainting, or confusion    Sudden behavior changes or irritability    Signs and Symptoms of Anaphylaxis   How is anaphylaxis diagnosed? Your healthcare provider will examine you for signs of anaphylaxis. He or she will ask if you have a history of allergies. Your provider will also ask about exposure to possible triggers and when they occurred. Tell him or her if you take medicines or have any health conditions. You may need more testing if you developed anaphylaxis after you were exposed to a trigger and then exercised. This is called exercise-induced anaphylaxis.    How is anaphylaxis treated?    Epinephrine is medicine used to treat severe allergic reactions such as anaphylaxis. It is given as a shot into the outer thigh muscle.    Medicines such as antihistamines, steroids, and bronchodilators decrease inflammation, open airways, and make breathing easier.    Oxygen may be needed if your blood oxygen level is lower than it should be. You may get oxygen through a mask placed over your nose and mouth or through small tubes placed in your nostrils.  What steps do I need to take for signs or symptoms of anaphylaxis?    Immediately give 1 shot of epinephrine only into the outer thigh muscle. Even if your allergic reaction seems mild, it can quickly become anaphylaxis. This may happen even if you had a mild reaction to the allergen in the past. Each exposure can cause a different reaction. Watch for signs and symptoms of anaphylaxis every time you are exposed to a trigger. Be ready to give a shot of epinephrine. It is okay to inject epinephrine through clothing. Just be careful to avoid seams, zippers, or other parts that can prevent the needle from entering your skin.  How to Give An Epinephrine Shot Adult      Leave the shot in place as directed. Your healthcare provider may recommend you leave it in place for up to 10 seconds before you remove it. This helps make sure all of the epinephrine is delivered.    Call 911 and go to the emergency department, even if the shot improved symptoms. Do not drive yourself. Bring the used epinephrine shot with you.  What safety precautions do I need to take?    Keep 2 shots of epinephrine with you at all times. You may need a second shot, because epinephrine only works for about 20 minutes and symptoms may return. Your healthcare provider can show you and family members how to give the shot. Check the expiration date every month and replace it before it expires.    Create an action plan. Your healthcare provider can help you create a written plan that explains the allergy and an emergency plan to treat a reaction. The plan explains when to give a second epinephrine shot if symptoms return or do not improve after the first. Give copies of the action plan and emergency instructions to family members, and work or school staff. Show them how to give a shot of epinephrine in case you are not able to give it to yourself.    Be careful when you exercise. If you have had exercise-induced anaphylaxis, do not exercise right after you eat. Stop exercising right away if you start to develop any signs or symptoms of anaphylaxis. You may first feel tired, warm, or have itchy skin. Hives, swelling, and severe breathing problems may develop if you continue to exercise.    Carry medical alert identification. Wear medical alert jewelry or carry a card that explains the allergy. Ask your healthcare provider where to get these items.  Medical Alert Jewelry      Identify and avoid known triggers. Read food labels for ingredients. Look for triggers in your environment.    Ask about treatments to prevent anaphylaxis. You may need allergy shots or other medicines to treat allergies.  Call 911 for any of the following:    You have a skin rash, hives, swelling, or itching.    You have trouble breathing, shortness of breath, wheezing, or coughing.    Your throat tightens or your lips or tongue swell.    You have difficulty swallowing or speaking.    You are dizzy, lightheaded, confused, or feel like you are going to faint.    You have nausea, diarrhea, or abdominal cramps, or you are vomiting.  When should I seek immediate care?    Signs or symptoms of anaphylaxis return.    When should I contact my healthcare provider?    You have questions or concerns about your condition or care.    CARE AGREEMENT:    You have the right to help plan your care. Learn about your health condition and how it may be treated. Discuss treatment options with your healthcare providers to decide what care you want to receive. You always have the right to refuse treatment. Patient advised to take meds as prescribed and follow up with PMD/ for follow up in 3-4 days.  patient and her  understands and agrees with plan.  Return to ER if symptoms worsens or develop new symptoms.   red flags discussed        Anaphylaxis    WHAT YOU NEED TO KNOW:    What is anaphylaxis? Anaphylaxis is a life-threatening allergic reaction that must be treated immediately. Your risk for anaphylaxis increases if you have asthma that is severe or not controlled. Medical conditions such as heart disease can also increase your risk. It is important to be prepared if you are at risk for anaphylaxis. Your symptoms can be worse each time you are exposed to the trigger.    What may trigger anaphylaxis? The following are some of the most common triggers:    Milk, peanuts, tree nuts, eggs, shellfish, wheat, and soy    Stings from bees, wasps, or fire ants    Antibiotics, NSAIDs, or aspirin    Latex    Exercise following exposure to another trigger, such as after you eat a trigger food  What are the signs and symptoms of anaphylaxis? You may have any of the following within seconds to hours after exposure to a trigger:    Trouble breathing, shortness of breath, wheezing, or coughing    Throat tightening, swelling of the lips or tongue, or trouble swallowing    Rash, hives, swelling, or itching    Nausea, vomiting, diarrhea, or abdominal cramps    Dizziness, lightheadedness, fainting, or confusion    Sudden behavior changes or irritability    Signs and Symptoms of Anaphylaxis   How is anaphylaxis diagnosed? Your healthcare provider will examine you for signs of anaphylaxis. He or she will ask if you have a history of allergies. Your provider will also ask about exposure to possible triggers and when they occurred. Tell him or her if you take medicines or have any health conditions. You may need more testing if you developed anaphylaxis after you were exposed to a trigger and then exercised. This is called exercise-induced anaphylaxis.    How is anaphylaxis treated?    Epinephrine is medicine used to treat severe allergic reactions such as anaphylaxis. It is given as a shot into the outer thigh muscle.    Medicines such as antihistamines, steroids, and bronchodilators decrease inflammation, open airways, and make breathing easier.    Oxygen may be needed if your blood oxygen level is lower than it should be. You may get oxygen through a mask placed over your nose and mouth or through small tubes placed in your nostrils.  What steps do I need to take for signs or symptoms of anaphylaxis?    Immediately give 1 shot of epinephrine only into the outer thigh muscle. Even if your allergic reaction seems mild, it can quickly become anaphylaxis. This may happen even if you had a mild reaction to the allergen in the past. Each exposure can cause a different reaction. Watch for signs and symptoms of anaphylaxis every time you are exposed to a trigger. Be ready to give a shot of epinephrine. It is okay to inject epinephrine through clothing. Just be careful to avoid seams, zippers, or other parts that can prevent the needle from entering your skin.  How to Give An Epinephrine Shot Adult      Leave the shot in place as directed. Your healthcare provider may recommend you leave it in place for up to 10 seconds before you remove it. This helps make sure all of the epinephrine is delivered.    Call 911 and go to the emergency department, even if the shot improved symptoms. Do not drive yourself. Bring the used epinephrine shot with you.  What safety precautions do I need to take?    Keep 2 shots of epinephrine with you at all times. You may need a second shot, because epinephrine only works for about 20 minutes and symptoms may return. Your healthcare provider can show you and family members how to give the shot. Check the expiration date every month and replace it before it expires.    Create an action plan. Your healthcare provider can help you create a written plan that explains the allergy and an emergency plan to treat a reaction. The plan explains when to give a second epinephrine shot if symptoms return or do not improve after the first. Give copies of the action plan and emergency instructions to family members, and work or school staff. Show them how to give a shot of epinephrine in case you are not able to give it to yourself.    Be careful when you exercise. If you have had exercise-induced anaphylaxis, do not exercise right after you eat. Stop exercising right away if you start to develop any signs or symptoms of anaphylaxis. You may first feel tired, warm, or have itchy skin. Hives, swelling, and severe breathing problems may develop if you continue to exercise.    Carry medical alert identification. Wear medical alert jewelry or carry a card that explains the allergy. Ask your healthcare provider where to get these items.  Medical Alert Jewelry      Identify and avoid known triggers. Read food labels for ingredients. Look for triggers in your environment.    Ask about treatments to prevent anaphylaxis. You may need allergy shots or other medicines to treat allergies.  Call 911 for any of the following:    You have a skin rash, hives, swelling, or itching.    You have trouble breathing, shortness of breath, wheezing, or coughing.    Your throat tightens or your lips or tongue swell.    You have difficulty swallowing or speaking.    You are dizzy, lightheaded, confused, or feel like you are going to faint.    You have nausea, diarrhea, or abdominal cramps, or you are vomiting.  When should I seek immediate care?    Signs or symptoms of anaphylaxis return.    When should I contact my healthcare provider?    You have questions or concerns about your condition or care.    CARE AGREEMENT:    You have the right to help plan your care. Learn about your health condition and how it may be treated. Discuss treatment options with your healthcare providers to decide what care you want to receive. You always have the right to refuse treatment.

## 2023-07-17 NOTE — ED PROVIDER NOTE - CLINICAL SUMMARY MEDICAL DECISION MAKING FREE TEXT BOX
69 y/o male w/PMHx of HLD, glaucoma, anxiety, asthma, GERD BIBEMS s/p allergic reaction after being stung by a yellow-jacket bee approx. 1hr ago on his upper posterior b/l arms and back. positive sensation of throat tightness. concern for allergic rx vs anaphylaxis. will get meds and monitor pt in ED. 67 y/o male w/PMHx of HLD, glaucoma, anxiety, asthma, GERD BIBEMS s/p allergic reaction after being stung by a yellow-jacket bee approx. 1hr ago on his upper posterior b/l arms and back. positive sensation of throat tightness. concern for allergic rx vs anaphylaxis. no angioedema. will get meds and monitor pt in ED.

## 2023-07-17 NOTE — ED ADULT NURSE NOTE - OBJECTIVE STATEMENT
Pt is a 68yr old male, A&OX4 and ambulatory, c/o bee sting 1 hour PTA. Endorses hives to b/l arms, neck, chest, and back, throat itchiness, and raspy voice. HX of allergic reaction to bee stings. Airway patent. No respiratory distress noted. Speaking in clear and complete sentences. Took 50mg of Benadryl PTA. Denies chest pain and SOB. Medicated as per order. In NAD.

## 2023-07-17 NOTE — ED PROVIDER NOTE - PROGRESS NOTE DETAILS
Pt states that symptoms have improved. Pt requesting to go home.    rash has improved. no resp distress.    Signs, symptoms, results were reviewed with patient.  Pt agreed to return if symptoms return and/or worsen.  Pt agrees to follow up with PMD. Red Flags discussed with patient. Patient understands to follow-up at their scheduled appointment and take medications as prescribed. All this was discussed in layman's terms with the patient.

## 2023-07-17 NOTE — ED ADULT TRIAGE NOTE - CHIEF COMPLAINT QUOTE
pt BIBEMS s/p allergic reaction. pt states he has previously been allergic to bees. pt states he was stung about one hour ago. as per EMS pt voice became more raspy on way to ER no epi given. pt took benadryl at home prior to EMS arrival

## 2023-07-19 LAB
B BURGDOR DNA SPEC QL NAA+PROBE: NEGATIVE — SIGNIFICANT CHANGE UP
CULTURE RESULTS: SIGNIFICANT CHANGE UP
CULTURE RESULTS: SIGNIFICANT CHANGE UP
SPECIMEN SOURCE: SIGNIFICANT CHANGE UP
SPECIMEN SOURCE: SIGNIFICANT CHANGE UP

## 2023-08-13 NOTE — ED POST DISCHARGE NOTE - REASON FOR FOLLOW-UP
Other Pt called looking for results. Called back and states hes fine and has no questions. -Hill Valentino PA-C

## 2023-09-20 ENCOUNTER — RX RENEWAL (OUTPATIENT)
Age: 68
End: 2023-09-20

## 2023-10-12 ENCOUNTER — EMERGENCY (EMERGENCY)
Facility: HOSPITAL | Age: 68
LOS: 0 days | Discharge: HOME | End: 2023-10-12
Attending: STUDENT IN AN ORGANIZED HEALTH CARE EDUCATION/TRAINING PROGRAM
Payer: MEDICARE

## 2023-10-12 ENCOUNTER — NON-APPOINTMENT (OUTPATIENT)
Age: 68
End: 2023-10-12

## 2023-10-12 ENCOUNTER — APPOINTMENT (OUTPATIENT)
Dept: INTERNAL MEDICINE | Facility: CLINIC | Age: 68
End: 2023-10-12
Payer: MEDICARE

## 2023-10-12 VITALS
TEMPERATURE: 98 F | SYSTOLIC BLOOD PRESSURE: 122 MMHG | OXYGEN SATURATION: 98 % | DIASTOLIC BLOOD PRESSURE: 62 MMHG | RESPIRATION RATE: 18 BRPM | HEART RATE: 64 BPM

## 2023-10-12 VITALS
OXYGEN SATURATION: 98 % | HEIGHT: 71 IN | RESPIRATION RATE: 17 BRPM | WEIGHT: 188.94 LBS | DIASTOLIC BLOOD PRESSURE: 60 MMHG | HEART RATE: 62 BPM | SYSTOLIC BLOOD PRESSURE: 104 MMHG | TEMPERATURE: 98 F

## 2023-10-12 VITALS
SYSTOLIC BLOOD PRESSURE: 100 MMHG | BODY MASS INDEX: 27.06 KG/M2 | RESPIRATION RATE: 16 BRPM | HEART RATE: 72 BPM | TEMPERATURE: 98 F | WEIGHT: 189 LBS | DIASTOLIC BLOOD PRESSURE: 60 MMHG | OXYGEN SATURATION: 95 % | HEIGHT: 70 IN

## 2023-10-12 DIAGNOSIS — Z91.030 BEE ALLERGY STATUS: ICD-10-CM

## 2023-10-12 DIAGNOSIS — R05.9 COUGH, UNSPECIFIED: ICD-10-CM

## 2023-10-12 DIAGNOSIS — K21.9 GASTRO-ESOPHAGEAL REFLUX DISEASE WITHOUT ESOPHAGITIS: ICD-10-CM

## 2023-10-12 DIAGNOSIS — F41.9 ANXIETY DISORDER, UNSPECIFIED: ICD-10-CM

## 2023-10-12 DIAGNOSIS — E78.5 HYPERLIPIDEMIA, UNSPECIFIED: ICD-10-CM

## 2023-10-12 DIAGNOSIS — R55 SYNCOPE AND COLLAPSE: ICD-10-CM

## 2023-10-12 DIAGNOSIS — J45.909 UNSPECIFIED ASTHMA, UNCOMPLICATED: ICD-10-CM

## 2023-10-12 DIAGNOSIS — Z86.69 PERSONAL HISTORY OF OTHER DISEASES OF THE NERVOUS SYSTEM AND SENSE ORGANS: ICD-10-CM

## 2023-10-12 DIAGNOSIS — Z86.16 PERSONAL HISTORY OF COVID-19: ICD-10-CM

## 2023-10-12 LAB — SARS-COV-2 AG RESP QL IA.RAPID: POSITIVE

## 2023-10-12 PROCEDURE — 99215 OFFICE O/P EST HI 40 MIN: CPT

## 2023-10-12 PROCEDURE — 99285 EMERGENCY DEPT VISIT HI MDM: CPT

## 2023-10-12 PROCEDURE — 71045 X-RAY EXAM CHEST 1 VIEW: CPT | Mod: 26

## 2023-10-12 PROCEDURE — 80053 COMPREHEN METABOLIC PANEL: CPT

## 2023-10-12 PROCEDURE — 85025 COMPLETE CBC W/AUTO DIFF WBC: CPT

## 2023-10-12 PROCEDURE — 84484 ASSAY OF TROPONIN QUANT: CPT

## 2023-10-12 PROCEDURE — 83735 ASSAY OF MAGNESIUM: CPT

## 2023-10-12 PROCEDURE — 93010 ELECTROCARDIOGRAM REPORT: CPT

## 2023-10-12 PROCEDURE — 87811 SARS-COV-2 COVID19 W/OPTIC: CPT | Mod: QW

## 2023-10-12 PROCEDURE — 71045 X-RAY EXAM CHEST 1 VIEW: CPT

## 2023-10-12 PROCEDURE — 99285 EMERGENCY DEPT VISIT HI MDM: CPT | Mod: 25

## 2023-10-12 PROCEDURE — 93005 ELECTROCARDIOGRAM TRACING: CPT

## 2023-10-12 PROCEDURE — 36415 COLL VENOUS BLD VENIPUNCTURE: CPT

## 2023-10-12 RX ORDER — HYDROCODONE BITARTRATE AND HOMATROPINE METHYLBROMIDE 5; 1.5 MG/5ML; MG/5ML
5 SOLUTION ORAL
Qty: 75 | Refills: 0
Start: 2023-10-12 | End: 2023-10-16

## 2023-10-12 NOTE — ED PROVIDER NOTE - OBJECTIVE STATEMENT
69 y/o male with no medical Hx, no cardiac or lung disease sent in from Dr. Garcia's office for syncopal episode, witnessed. Denies any falls. LOC was brief in nature when pt woke up was back to baseline. Pt reports has been sick for the past week with COVID, c/o associated cough. Denies CP, SOB, fevers.

## 2023-10-12 NOTE — ED ADULT NURSE NOTE - OBJECTIVE STATEMENT
Pt presented to the ED c/o cough for the past week. Pt went to his doctor's office this morning and had a near syncopal episode and hypotension. Denies LOC. Pt reports he was feeling dizzy and lightheaded but that has resolved now. Pt denies SOB, chest pain, n/v. Pt tested COVID + this morning. BP hypotensive with 100 systolic. AO x 3, breathing unlabored.

## 2023-10-12 NOTE — ED ADULT TRIAGE NOTE - MODE OF ARRIVAL
Left patient a message to make an appointment with Dr. Serna. Also left a [employer] message.    [facility]

## 2023-10-12 NOTE — ED ADULT NURSE NOTE - NSFALLUNIVINTERV_ED_ALL_ED
Bed/Stretcher in lowest position, wheels locked, appropriate side rails in place/Call bell, personal items and telephone in reach/Instruct patient to call for assistance before getting out of bed/chair/stretcher/Non-slip footwear applied when patient is off stretcher/[place] to call system/Physically safe environment - no spills, clutter or unnecessary equipment/Purposeful proactive rounding/Room/bathroom lighting operational, light cord in reach

## 2023-10-12 NOTE — ED PROVIDER NOTE - CLINICAL SUMMARY MEDICAL DECISION MAKING FREE TEXT BOX
Adult male with syncopal episode in the setting of COVID. Vitals are normal here he feels well. Labs are negative including troponin. CXR is clear of PNA. Will discharge.

## 2023-10-12 NOTE — ED ADULT TRIAGE NOTE - CHIEF COMPLAINT QUOTE
Patient presents to the ER with complaints of cough worse since Tuesday and syncopal episode at Dr. Garcia's office. Patient alert and oriented at this time, Covid positive today at office, hypotensive at office.

## 2023-10-12 NOTE — ED PROVIDER NOTE - PATIENT PORTAL LINK FT
You can access the FollowMyHealth Patient Portal offered by [facility] by registering at the following website: [email or web address]. By joining [employer]’s FollowMyHealth portal, you will also be able to view your health information using other applications (apps) compatible with our system.

## 2023-10-12 NOTE — ED PROVIDER NOTE - NSFOLLOWUPINSTRUCTIONS_ED_ALL_ED_FT
Seek immediate medical assistance for any new or worsening symptoms. If you have issues obtaining follow up, please call: [phone] or [phone]  to obtain a doctor or specialist who takes your insurance in your area.     Please take 600 mg of Ibuprofen (aka Motrin, Advil) and/or 650 mg Acetaminophen (aka Tylenol) every 6 hours, as needed, for mild-moderate pain.    Please do not take these medications if you do not have pain or if you have any history of bleeding disorders, kidney or liver disease.   Do not use ibuprofen if you are on blood thinners (anti-coagulation).    Take Hycodan 3 times a day as needed for cough.

## 2023-10-13 RX ORDER — HYDROCODONE BITARTRATE AND HOMATROPINE METHYLBROMIDE 5; 1.5 MG/5ML; MG/5ML
5 SOLUTION ORAL
Qty: 75 | Refills: 0
Start: 2023-10-13 | End: 2023-10-17

## 2023-10-20 ENCOUNTER — APPOINTMENT (OUTPATIENT)
Dept: INTERNAL MEDICINE | Facility: CLINIC | Age: 68
End: 2023-10-20
Payer: MEDICARE

## 2023-10-20 VITALS
OXYGEN SATURATION: 96 % | RESPIRATION RATE: 16 BRPM | TEMPERATURE: 98.5 F | SYSTOLIC BLOOD PRESSURE: 130 MMHG | HEART RATE: 78 BPM | BODY MASS INDEX: 26.48 KG/M2 | WEIGHT: 185 LBS | DIASTOLIC BLOOD PRESSURE: 80 MMHG | HEIGHT: 70 IN

## 2023-10-20 DIAGNOSIS — U07.1 COVID-19: ICD-10-CM

## 2023-10-20 DIAGNOSIS — J32.9 CHRONIC SINUSITIS, UNSPECIFIED: ICD-10-CM

## 2023-10-20 DIAGNOSIS — B96.89 CHRONIC SINUSITIS, UNSPECIFIED: ICD-10-CM

## 2023-10-20 DIAGNOSIS — H61.20 IMPACTED CERUMEN, UNSPECIFIED EAR: ICD-10-CM

## 2023-10-20 PROCEDURE — 99214 OFFICE O/P EST MOD 30 MIN: CPT

## 2023-11-15 ENCOUNTER — APPOINTMENT (OUTPATIENT)
Dept: OTOLARYNGOLOGY | Facility: CLINIC | Age: 68
End: 2023-11-15
Payer: MEDICARE

## 2023-11-15 VITALS — HEIGHT: 71 IN | WEIGHT: 190 LBS | TEMPERATURE: 97 F | BODY MASS INDEX: 26.6 KG/M2

## 2023-11-15 DIAGNOSIS — H90.3 SENSORINEURAL HEARING LOSS, BILATERAL: ICD-10-CM

## 2023-11-15 DIAGNOSIS — H61.23 IMPACTED CERUMEN, BILATERAL: ICD-10-CM

## 2023-11-15 DIAGNOSIS — H69.93 UNSPECIFIED EUSTACHIAN TUBE DISORDER, BILATERAL: ICD-10-CM

## 2023-11-15 DIAGNOSIS — R09.82 POSTNASAL DRIP: ICD-10-CM

## 2023-11-15 PROCEDURE — 92567 TYMPANOMETRY: CPT

## 2023-11-15 PROCEDURE — G0268 REMOVAL OF IMPACTED WAX MD: CPT

## 2023-11-15 PROCEDURE — 92557 COMPREHENSIVE HEARING TEST: CPT

## 2023-11-15 PROCEDURE — 99203 OFFICE O/P NEW LOW 30 MIN: CPT | Mod: 25

## 2023-11-15 RX ORDER — AZELASTINE HYDROCHLORIDE 137 UG/1
0.1 SPRAY, METERED NASAL TWICE DAILY
Qty: 1 | Refills: 5 | Status: ACTIVE | COMMUNITY
Start: 2023-11-15 | End: 1900-01-01

## 2023-11-21 ENCOUNTER — APPOINTMENT (OUTPATIENT)
Dept: ORTHOPEDIC SURGERY | Facility: CLINIC | Age: 68
End: 2023-11-21
Payer: MEDICARE

## 2023-11-21 ENCOUNTER — NON-APPOINTMENT (OUTPATIENT)
Age: 68
End: 2023-11-21

## 2023-11-21 DIAGNOSIS — M76.52 PATELLAR TENDINITIS, LEFT KNEE: ICD-10-CM

## 2023-11-21 DIAGNOSIS — M17.11 UNILATERAL PRIMARY OSTEOARTHRITIS, RIGHT KNEE: ICD-10-CM

## 2023-11-21 PROCEDURE — 73564 X-RAY EXAM KNEE 4 OR MORE: CPT | Mod: 50

## 2023-11-21 PROCEDURE — 99214 OFFICE O/P EST MOD 30 MIN: CPT

## 2023-11-21 RX ORDER — MELOXICAM 15 MG/1
15 TABLET ORAL
Qty: 21 | Refills: 2 | Status: ACTIVE | COMMUNITY
Start: 2023-11-21 | End: 1900-01-01

## 2023-12-04 ENCOUNTER — RESULT CHARGE (OUTPATIENT)
Age: 68
End: 2023-12-04

## 2023-12-05 ENCOUNTER — NON-APPOINTMENT (OUTPATIENT)
Age: 68
End: 2023-12-05

## 2023-12-05 ENCOUNTER — APPOINTMENT (OUTPATIENT)
Dept: INTERNAL MEDICINE | Facility: CLINIC | Age: 68
End: 2023-12-05
Payer: MEDICARE

## 2023-12-05 VITALS
DIASTOLIC BLOOD PRESSURE: 70 MMHG | RESPIRATION RATE: 16 BRPM | WEIGHT: 197 LBS | TEMPERATURE: 98 F | SYSTOLIC BLOOD PRESSURE: 120 MMHG | HEIGHT: 71 IN | OXYGEN SATURATION: 95 % | BODY MASS INDEX: 27.58 KG/M2 | HEART RATE: 71 BPM

## 2023-12-05 DIAGNOSIS — J06.9 ACUTE UPPER RESPIRATORY INFECTION, UNSPECIFIED: ICD-10-CM

## 2023-12-05 PROCEDURE — 99214 OFFICE O/P EST MOD 30 MIN: CPT | Mod: 25

## 2023-12-05 PROCEDURE — 94060 EVALUATION OF WHEEZING: CPT

## 2023-12-05 PROCEDURE — 95012 NITRIC OXIDE EXP GAS DETER: CPT

## 2023-12-05 RX ORDER — BROMPHENIRAMINE MALEATE, PSEUDOEPHEDRINE HYDROCHLORIDE, 2; 30; 10 MG/5ML; MG/5ML; MG/5ML
30-2-10 SYRUP ORAL
Qty: 300 | Refills: 0 | Status: DISCONTINUED | COMMUNITY
Start: 2023-05-23 | End: 2023-12-05

## 2023-12-05 RX ORDER — PREDNISONE 20 MG/1
20 TABLET ORAL TWICE DAILY
Qty: 14 | Refills: 0 | Status: DISCONTINUED | COMMUNITY
Start: 2023-10-20 | End: 2023-12-05

## 2023-12-05 RX ORDER — DOXYCYCLINE HYCLATE 100 MG/1
100 TABLET ORAL
Qty: 20 | Refills: 0 | Status: DISCONTINUED | COMMUNITY
Start: 2023-10-20 | End: 2023-12-05

## 2023-12-05 RX ORDER — AZELASTINE HYDROCHLORIDE 137 UG/1
137 SPRAY, METERED NASAL
Qty: 1 | Refills: 2 | Status: DISCONTINUED | COMMUNITY
Start: 2023-05-23 | End: 2023-12-05

## 2023-12-05 RX ORDER — PROMETHAZINE HYDROCHLORIDE AND DEXTROMETHORPHAN HYDROBROMIDE ORAL SOLUTION 15; 6.25 MG/5ML; MG/5ML
6.25-15 SOLUTION ORAL
Qty: 300 | Refills: 0 | Status: DISCONTINUED | COMMUNITY
Start: 2023-10-20 | End: 2023-12-05

## 2023-12-11 ENCOUNTER — APPOINTMENT (OUTPATIENT)
Dept: DERMATOLOGY | Facility: CLINIC | Age: 68
End: 2023-12-11
Payer: MEDICARE

## 2023-12-11 DIAGNOSIS — L81.4 OTHER MELANIN HYPERPIGMENTATION: ICD-10-CM

## 2023-12-11 DIAGNOSIS — D22.9 MELANOCYTIC NEVI, UNSPECIFIED: ICD-10-CM

## 2023-12-11 DIAGNOSIS — Z41.1 ENCOUNTER FOR COSMETIC SURGERY: ICD-10-CM

## 2023-12-11 DIAGNOSIS — L82.1 OTHER SEBORRHEIC KERATOSIS: ICD-10-CM

## 2023-12-11 PROCEDURE — D0123: CPT

## 2023-12-11 PROCEDURE — 99213 OFFICE O/P EST LOW 20 MIN: CPT

## 2023-12-22 ENCOUNTER — RX RENEWAL (OUTPATIENT)
Age: 68
End: 2023-12-22

## 2023-12-22 RX ORDER — FLUTICASONE FUROATE 200 UG/1
200 POWDER RESPIRATORY (INHALATION) DAILY
Qty: 30 | Refills: 11 | Status: ACTIVE | COMMUNITY
Start: 2021-08-05 | End: 1900-01-01

## 2024-03-03 NOTE — ED ADULT NURSE NOTE - NS ED NOTE  TALK SOMEONE YN
[facility]   part of [facility] Infectious Disease  Progress Note    Mahsa Zhang Patient Status:  Inpatient    1939 MRN [id number]   Location [facility] 4SW-A Attending Veronica Hood,    Hosp Day # 6 PCP Agus Martinez MD     Subjective:  Patient seen/examined.  Up in bed comfortable, family at bedside.  No new overnight events.  No F/C.  No N/V/D.    Objective:  Blood pressure 117/54, pulse 75, temperature 96.8 °F (36 °C), temperature source Temporal, resp. rate (!) 27, height 5' 10\" (1.778 m), weight 159 lb 13.3 oz (72.5 kg), SpO2 92%.    Intake/Output:    Intake/Output Summary (Last 24 hours) at 3/3/2024 1139  Last data filed at 3/3/2024 1133  Gross per 24 hour   Intake 1455.6 ml   Output 1850 ml   Net -394.4 ml       Physical Exam:  General: Awake, alert, non-tox, NAD.  HEENT:  Oropharynx clear, trachea ML.  Heart: RRR S1S2 no murmurs.  Lungs: Essentially CTA b/l, no rhonchi, rales, wheezes.  Abdomen: Soft, NT/ND.  BS present.  No organomegaly.  Extremity: No edema.  Neurological: No focal deficits.  Derm:  Warm, dry, free from rashes.    Lab Data Review:  Lab Results   Component Value Date    WBC 5.9 2024    HGB 10.7 2024    HCT 32.7 2024    .0 2024    CREATSERUM 1.34 2024    BUN 28 2024     2024    K 4.0 2024     2024    CO2 22.0 2024     2024    CA 9.0 2024    ALB 1.6 2024    ALKPHO 382 2024    BILT 1.3 2024    TP 5.2 2024    AST 98 2024     2024    INR 1.40 2024      Cultures:  R pleural fluid negative  L pleural fluid negative     Blood cultures negative     C.diff negative    Sputum negative     MRSA negative     Urine cultures negative     RVP negative     Radiology:  CONCLUSION:       1. Geographic areas of cortical hypoattenuation throughout the left kidney, representing new findings compared to 2023.   Given advanced calcified atherosclerosis of the abdominal aorta and major arterial branches, findings may reflect multifocal renal cortical infarcts.  Differential includes multifocal acute pyelonephritis.  Correlate with clinical evidence of active infection.  No organized intrarenal or perinephric collections.      2. Engorgement of intrahepatic IVC and hepatic veins with somewhat heterogeneous hepatic perfusion indicating passive hepatic congestion with associated periportal edema.      3. Mild right and small left pleural effusions.      Antibiotics Reviewed:  Zosyn     Assessment and Plan:     Possible sepsis presenting with fevers, chills, flank pain and source unclear  - Sepsis w/u negative thus far with blood and urine cultures negative  - b/l thoracentesis with transudative fluid  - Possible L pyelo noted on imaging vs. Multifocal cortical infarcts  - TTE without vegetations   - PCT with mild elevation only  - IV zosyn ongoing     2.  Acute hypoxic respiratory failure with b/l pleural effusions  - s/p b/l thoracentesis procedures     3.  Elevated crea, trending down     4.  Disposition - inpatient.  F/u all pending cultures and continue empiric zosyn as Rx.  Viral studies remain pending for CMV, EBV.  Multiple additional cultures pending as well.  Source of this presentation remains largely elusive but  appears most likely with imaging findings most c/w L sided pyelonephritis vs. Other.  Duration of IV antibiotics to be determined.  Will follow.    Felicita Wheat DO, [facility] Infectious Disease  [phone]    3/3/2024  11:39 AM     No

## 2024-03-05 ENCOUNTER — RX RENEWAL (OUTPATIENT)
Age: 69
End: 2024-03-05

## 2024-03-05 RX ORDER — MONTELUKAST 10 MG/1
10 TABLET, FILM COATED ORAL
Qty: 90 | Refills: 1 | Status: ACTIVE | COMMUNITY
Start: 2023-05-23 | End: 1900-01-01

## 2024-04-11 NOTE — PATIENT PROFILE ADULT - IS THERE A SUSPICION OF ABUSE/NEGLIGENCE?
every 1-2 years to screen for glaucoma; cataracts, macular degeneration, and other eye disorders.  A preventive dental visit is recommended every 6 months.  Try to get at least 150 minutes of exercise per week or 10,000 steps per day on a pedometer .  Order or download the FREE \"Exercise & Physical Activity: Your Everyday Guide\" from [employer] [place] [employer]. Call 1-301.225.9193 or search [employer] [place] [employer] online.  You need 5249-4808 mg of calcium and 5451-5691 IU of vitamin D per day. It is possible to meet your calcium requirement with diet alone, but a vitamin D supplement is usually necessary to meet this goal.  When exposed to the sun, use a sunscreen that protects against both UVA and UVB radiation with an SPF of 30 or greater. Reapply every 2 to 3 hours or after sweating, drying off with a towel, or swimming.  Always wear a seat belt when traveling in a car. Always wear a helmet when riding a bicycle or motorcycle.   no

## 2024-05-21 ENCOUNTER — APPOINTMENT (OUTPATIENT)
Dept: DERMATOLOGY | Facility: CLINIC | Age: 69
End: 2024-05-21
Payer: MEDICARE

## 2024-05-21 DIAGNOSIS — L57.0 ACTINIC KERATOSIS: ICD-10-CM
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
## 2024-05-21 DIAGNOSIS — L30.9 DERMATITIS, UNSPECIFIED: ICD-10-CM

## 2024-05-21 DIAGNOSIS — L72.3 SEBACEOUS CYST: ICD-10-CM

## 2024-05-21 PROCEDURE — 10060 I&D ABSCESS SIMPLE/SINGLE: CPT

## 2024-05-21 PROCEDURE — 17003 DESTRUCT PREMALG LES 2-14: CPT

## 2024-05-21 PROCEDURE — 17000 DESTRUCT PREMALG LESION: CPT | Mod: 59

## 2024-05-21 PROCEDURE — 99214 OFFICE O/P EST MOD 30 MIN: CPT | Mod: 25

## 2024-05-21 RX ORDER — FLUTICASONE PROPIONATE 0.5 MG/G
0.05 CREAM TOPICAL TWICE DAILY
Qty: 1 | Refills: 2 | Status: ACTIVE | COMMUNITY
Start: 2024-05-21 | End: 1900-01-01

## 2024-05-21 NOTE — HISTORY OF PRESENT ILLNESS
[FreeTextEntry1] : Rash on the chest, irritated lesions on the chest and tick bite in the groin. [de-identified] : Itching on the chest and shoulders.  Weeks. new dark spots on the right chest and new rough lesions on the sternum. Tick of the groin, present for approx. a day.  Numerous ticks removed from body recently.

## 2024-05-21 NOTE — PHYSICAL EXAM
[Alert] : alert [Oriented x 3] : ~L oriented x 3 [Well Nourished] : well nourished [FreeTextEntry3] : Erythema patches of the chest, shoulders and upper back.  keratotic papules x 2 - sternum.  Cystic papules with comedonal orifice x 2, right medial chest.  Erythematous patch of the left groin.

## 2024-05-21 NOTE — ASSESSMENT
[FreeTextEntry1] : Eczematous dermatitis Cutivate cream bid. Education.  Tick bite - call if further rash. Check tick bite titers in 10-14 days.

## 2024-05-25 ENCOUNTER — NON-APPOINTMENT (OUTPATIENT)
Age: 69
End: 2024-05-25

## 2024-06-28 ENCOUNTER — APPOINTMENT (OUTPATIENT)
Dept: INTERNAL MEDICINE | Facility: CLINIC | Age: 69
End: 2024-06-28
Payer: MEDICARE

## 2024-06-28 VITALS
RESPIRATION RATE: 16 BRPM | TEMPERATURE: 97 F | WEIGHT: 191 LBS | HEIGHT: 71 IN | HEART RATE: 61 BPM | OXYGEN SATURATION: 97 % | BODY MASS INDEX: 26.74 KG/M2 | SYSTOLIC BLOOD PRESSURE: 108 MMHG | DIASTOLIC BLOOD PRESSURE: 64 MMHG

## 2024-06-28 DIAGNOSIS — Z87.891 PERSONAL HISTORY OF NICOTINE DEPENDENCE: ICD-10-CM

## 2024-06-28 DIAGNOSIS — J98.01 ACUTE BRONCHOSPASM: ICD-10-CM

## 2024-06-28 DIAGNOSIS — J45.30 MILD PERSISTENT ASTHMA, UNCOMPLICATED: ICD-10-CM

## 2024-06-28 DIAGNOSIS — R05.9 COUGH, UNSPECIFIED: ICD-10-CM

## 2024-06-28 PROCEDURE — ZZZZZ: CPT

## 2024-06-28 PROCEDURE — 99215 OFFICE O/P EST HI 40 MIN: CPT | Mod: 25

## 2024-06-28 PROCEDURE — 94729 DIFFUSING CAPACITY: CPT

## 2024-06-28 PROCEDURE — 94727 GAS DIL/WSHOT DETER LNG VOL: CPT

## 2024-06-28 PROCEDURE — 94060 EVALUATION OF WHEEZING: CPT

## 2024-07-30 ENCOUNTER — APPOINTMENT (OUTPATIENT)
Dept: DERMATOLOGY | Facility: CLINIC | Age: 69
End: 2024-07-30
Payer: MEDICARE

## 2024-07-30 DIAGNOSIS — L82.1 OTHER SEBORRHEIC KERATOSIS: ICD-10-CM

## 2024-07-30 DIAGNOSIS — B36.0 PITYRIASIS VERSICOLOR: ICD-10-CM

## 2024-07-30 DIAGNOSIS — L82.0 INFLAMED SEBORRHEIC KERATOSIS: ICD-10-CM

## 2024-07-30 DIAGNOSIS — L73.8 OTHER SPECIFIED FOLLICULAR DISORDERS: ICD-10-CM

## 2024-07-30 PROCEDURE — 99214 OFFICE O/P EST MOD 30 MIN: CPT | Mod: 25

## 2024-07-30 PROCEDURE — 17110 DESTRUCTION B9 LES UP TO 14: CPT

## 2024-07-30 RX ORDER — KETOCONAZOLE 20.5 MG/ML
2 SHAMPOO, SUSPENSION TOPICAL
Qty: 3 | Refills: 4 | Status: ACTIVE | COMMUNITY
Start: 2024-07-30 | End: 1900-01-01

## 2024-07-30 RX ORDER — KETOCONAZOLE 20 MG/G
2 CREAM TOPICAL
Qty: 1 | Refills: 5 | Status: ACTIVE | COMMUNITY
Start: 2024-07-30 | End: 1900-01-01

## 2024-07-30 NOTE — ASSESSMENT
[FreeTextEntry1] : Tinea Versicolor - chronic; flaring - Benign condition discussed with patient, including non-contagious nature - Discussed chronic nature of this rash, particularly with flares in the summer during hot weather or increased sweating conditions. Discussed slow resolution of dyschromia after treatment. - Start ketoconazole 2% cream to AA BID while rash is active. SED. - Start ketoconazole 2% shampoo  everyday from neck to waist 3x per week for maintenance. Apply to neck and torso, let sit for 5 minutes then rinse. SED.  Sebaceous hyperplasia, face - Benign condition discussed with patient - No further treatment indicated at this time  Seborrheic Keratosis - These growths are benign - Related to genetics - these lesions run in families; NOT related to sun exposure - No treatment warranted unless inflamed; can use OTC Sarna lotion PRN itch  Inflamed Seborrheic Keratosis - x2 - Given inflammatory nature of lesions above, will treat with cryosurgery - Patient was verbally consented. Lesions treated with liquid nitrogen f/t/f x2 cycles. Patient tolerated it well. - Side effects include blister formation, hypopigmentation, and scarring - Wound care with vaseline

## 2024-07-30 NOTE — HISTORY OF PRESENT ILLNESS
[FreeTextEntry1] : spots [de-identified] : ROSE DIGGS is a 69 year old M who present for f/u as below.   #Spots spreading on shoulders and forearms.  using fluticasone, not helping  #Itchy growth on hairline and R temple    Derm hx: eczema Personal hx of skin cancer: no FHx of skin cancer: no Social hx:

## 2024-07-30 NOTE — PHYSICAL EXAM
[Alert] : alert [Oriented x 3] : ~L oriented x 3 [Well Nourished] : well nourished [Conjunctiva Non-injected] : conjunctiva non-injected [No Visual Lymphadenopathy] : no visual  lymphadenopathy [No Clubbing] : no clubbing [No Edema] : no edema [No Bromhidrosis] : no bromhidrosis [No Chromhidrosis] : no chromhidrosis [Declined] : declined [FreeTextEntry3] : scaly hyperpigmented oval patches on shoulders, back, forearms stuckon waxy brown papules intermixed on torso inflamed SKs on hairline and R temple

## 2024-12-02 ENCOUNTER — RX RENEWAL (OUTPATIENT)
Age: 69
End: 2024-12-02

## 2024-12-09 ENCOUNTER — APPOINTMENT (OUTPATIENT)
Dept: DERMATOLOGY | Facility: CLINIC | Age: 69
End: 2024-12-09
Payer: MEDICARE

## 2024-12-09 ENCOUNTER — NON-APPOINTMENT (OUTPATIENT)
Age: 69
End: 2024-12-09

## 2024-12-09 DIAGNOSIS — Z86.19 PERSONAL HISTORY OF OTHER INFECTIOUS AND PARASITIC DISEASES: ICD-10-CM

## 2024-12-09 DIAGNOSIS — L72.3 SEBACEOUS CYST: ICD-10-CM

## 2024-12-09 DIAGNOSIS — D22.9 MELANOCYTIC NEVI, UNSPECIFIED: ICD-10-CM

## 2024-12-09 DIAGNOSIS — Z87.2 PERSONAL HISTORY OF DISEASES OF THE SKIN AND SUBCUTANEOUS TISSUE: ICD-10-CM

## 2024-12-09 DIAGNOSIS — Z86.018 PERSONAL HISTORY OF OTHER BENIGN NEOPLASM: ICD-10-CM

## 2024-12-09 DIAGNOSIS — L57.0 ACTINIC KERATOSIS: ICD-10-CM

## 2024-12-09 DIAGNOSIS — L82.1 OTHER SEBORRHEIC KERATOSIS: ICD-10-CM

## 2024-12-09 DIAGNOSIS — B36.0 PITYRIASIS VERSICOLOR: ICD-10-CM

## 2024-12-09 DIAGNOSIS — L81.4 OTHER MELANIN HYPERPIGMENTATION: ICD-10-CM

## 2024-12-09 DIAGNOSIS — D18.01 HEMANGIOMA OF SKIN AND SUBCUTANEOUS TISSUE: ICD-10-CM

## 2024-12-09 PROCEDURE — 17000 DESTRUCT PREMALG LESION: CPT

## 2024-12-09 PROCEDURE — 99213 OFFICE O/P EST LOW 20 MIN: CPT | Mod: 25

## 2024-12-09 PROCEDURE — 17003 DESTRUCT PREMALG LES 2-14: CPT

## 2025-01-07 ENCOUNTER — NON-APPOINTMENT (OUTPATIENT)
Age: 70
End: 2025-01-07

## 2025-01-07 ENCOUNTER — APPOINTMENT (OUTPATIENT)
Dept: INTERNAL MEDICINE | Facility: CLINIC | Age: 70
End: 2025-01-07
Payer: MEDICARE

## 2025-01-07 VITALS
WEIGHT: 196 LBS | HEART RATE: 69 BPM | TEMPERATURE: 97.9 F | SYSTOLIC BLOOD PRESSURE: 114 MMHG | HEIGHT: 71 IN | OXYGEN SATURATION: 96 % | DIASTOLIC BLOOD PRESSURE: 62 MMHG | BODY MASS INDEX: 27.44 KG/M2

## 2025-01-07 DIAGNOSIS — J98.01 ACUTE BRONCHOSPASM: ICD-10-CM

## 2025-01-07 DIAGNOSIS — J45.909 UNSPECIFIED ASTHMA, UNCOMPLICATED: ICD-10-CM

## 2025-01-07 DIAGNOSIS — J45.30 MILD PERSISTENT ASTHMA, UNCOMPLICATED: ICD-10-CM

## 2025-01-07 PROCEDURE — 99214 OFFICE O/P EST MOD 30 MIN: CPT

## 2025-01-07 RX ORDER — PROMETHAZINE HYDROCHLORIDE AND DEXTROMETHORPHAN HYDROBROMIDE ORAL SOLUTION 15; 6.25 MG/5ML; MG/5ML
6.25-15 SOLUTION ORAL
Qty: 300 | Refills: 0 | Status: ACTIVE | COMMUNITY
Start: 2025-01-07 | End: 1900-01-01

## 2025-01-07 RX ORDER — PREDNISONE 20 MG/1
20 TABLET ORAL TWICE DAILY
Qty: 10 | Refills: 0 | Status: ACTIVE | COMMUNITY
Start: 2025-01-07 | End: 1900-01-01

## 2025-01-31 ENCOUNTER — NON-APPOINTMENT (OUTPATIENT)
Age: 70
End: 2025-01-31

## 2025-01-31 ENCOUNTER — RESULT CHARGE (OUTPATIENT)
Age: 70
End: 2025-01-31

## 2025-01-31 ENCOUNTER — APPOINTMENT (OUTPATIENT)
Dept: INTERNAL MEDICINE | Facility: CLINIC | Age: 70
End: 2025-01-31
Payer: MEDICARE

## 2025-01-31 VITALS
TEMPERATURE: 97.6 F | HEART RATE: 69 BPM | HEIGHT: 71 IN | SYSTOLIC BLOOD PRESSURE: 100 MMHG | OXYGEN SATURATION: 97 % | RESPIRATION RATE: 15 BRPM | WEIGHT: 192 LBS | DIASTOLIC BLOOD PRESSURE: 60 MMHG | BODY MASS INDEX: 26.88 KG/M2

## 2025-01-31 DIAGNOSIS — Z87.891 PERSONAL HISTORY OF NICOTINE DEPENDENCE: ICD-10-CM

## 2025-01-31 DIAGNOSIS — E78.00 PURE HYPERCHOLESTEROLEMIA, UNSPECIFIED: ICD-10-CM

## 2025-01-31 DIAGNOSIS — J45.30 MILD PERSISTENT ASTHMA, UNCOMPLICATED: ICD-10-CM

## 2025-01-31 DIAGNOSIS — J45.909 UNSPECIFIED ASTHMA, UNCOMPLICATED: ICD-10-CM

## 2025-01-31 DIAGNOSIS — R05.9 COUGH, UNSPECIFIED: ICD-10-CM

## 2025-01-31 DIAGNOSIS — Z00.00 ENCOUNTER FOR GENERAL ADULT MEDICAL EXAMINATION W/OUT ABNORMAL FINDINGS: ICD-10-CM

## 2025-01-31 DIAGNOSIS — J01.00 ACUTE MAXILLARY SINUSITIS, UNSPECIFIED: ICD-10-CM

## 2025-01-31 PROCEDURE — 99214 OFFICE O/P EST MOD 30 MIN: CPT | Mod: 25

## 2025-01-31 PROCEDURE — 95012 NITRIC OXIDE EXP GAS DETER: CPT

## 2025-01-31 PROCEDURE — 94060 EVALUATION OF WHEEZING: CPT

## 2025-01-31 RX ORDER — PREDNISONE 20 MG/1
20 TABLET ORAL
Qty: 16 | Refills: 0 | Status: DISCONTINUED | COMMUNITY
Start: 2025-01-31 | End: 2025-01-31

## 2025-01-31 RX ORDER — PREDNISONE 20 MG/1
20 TABLET ORAL
Qty: 16 | Refills: 0 | Status: ACTIVE | COMMUNITY
Start: 2025-01-31 | End: 1900-01-01

## 2025-01-31 RX ORDER — DOXYCYCLINE 100 MG/1
100 CAPSULE ORAL
Qty: 28 | Refills: 0 | Status: ACTIVE | COMMUNITY
Start: 2025-01-31 | End: 1900-01-01

## 2025-02-04 RX ORDER — BUDESONIDE 0.5 MG/2ML
0.5 INHALANT ORAL TWICE DAILY
Qty: 60 | Refills: 11 | Status: ACTIVE | COMMUNITY
Start: 2025-01-31

## 2025-04-17 ENCOUNTER — NON-APPOINTMENT (OUTPATIENT)
Age: 70
End: 2025-04-17

## 2025-04-17 ENCOUNTER — APPOINTMENT (OUTPATIENT)
Dept: INTERNAL MEDICINE | Facility: CLINIC | Age: 70
End: 2025-04-17
Payer: MEDICARE

## 2025-04-17 VITALS
TEMPERATURE: 97.6 F | BODY MASS INDEX: 26.04 KG/M2 | SYSTOLIC BLOOD PRESSURE: 120 MMHG | RESPIRATION RATE: 15 BRPM | DIASTOLIC BLOOD PRESSURE: 70 MMHG | WEIGHT: 186 LBS | OXYGEN SATURATION: 95 % | HEIGHT: 71 IN | HEART RATE: 70 BPM

## 2025-04-17 DIAGNOSIS — J45.30 MILD PERSISTENT ASTHMA, UNCOMPLICATED: ICD-10-CM

## 2025-04-17 DIAGNOSIS — Z87.891 PERSONAL HISTORY OF NICOTINE DEPENDENCE: ICD-10-CM

## 2025-04-17 DIAGNOSIS — J32.9 CHRONIC SINUSITIS, UNSPECIFIED: ICD-10-CM

## 2025-04-17 DIAGNOSIS — E78.00 PURE HYPERCHOLESTEROLEMIA, UNSPECIFIED: ICD-10-CM

## 2025-04-17 DIAGNOSIS — E78.1 PURE HYPERGLYCERIDEMIA: ICD-10-CM

## 2025-04-17 DIAGNOSIS — J45.909 UNSPECIFIED ASTHMA, UNCOMPLICATED: ICD-10-CM

## 2025-04-17 PROCEDURE — 93000 ELECTROCARDIOGRAM COMPLETE: CPT

## 2025-04-17 PROCEDURE — 99214 OFFICE O/P EST MOD 30 MIN: CPT | Mod: 25

## 2025-04-17 PROCEDURE — 94010 BREATHING CAPACITY TEST: CPT

## 2025-04-17 RX ORDER — FLUTICASONE FUROATE AND VILANTEROL TRIFENATATE 200; 25 UG/1; UG/1
200-25 POWDER RESPIRATORY (INHALATION)
Qty: 3 | Refills: 3 | Status: ACTIVE | COMMUNITY
Start: 2025-04-17 | End: 1900-01-01

## 2025-04-28 ENCOUNTER — NON-APPOINTMENT (OUTPATIENT)
Age: 70
End: 2025-04-28

## 2025-06-09 ENCOUNTER — RX RENEWAL (OUTPATIENT)
Age: 70
End: 2025-06-09

## 2025-06-30 ENCOUNTER — APPOINTMENT (OUTPATIENT)
Dept: DERMATOLOGY | Facility: CLINIC | Age: 70
End: 2025-06-30

## 2025-07-07 ENCOUNTER — APPOINTMENT (OUTPATIENT)
Dept: DERMATOLOGY | Facility: CLINIC | Age: 70
End: 2025-07-07
Payer: MEDICARE

## 2025-07-07 PROCEDURE — 99213 OFFICE O/P EST LOW 20 MIN: CPT | Mod: 25

## 2025-07-07 PROCEDURE — 17110 DESTRUCTION B9 LES UP TO 14: CPT

## 2025-07-11 ENCOUNTER — APPOINTMENT (OUTPATIENT)
Dept: INTERNAL MEDICINE | Facility: CLINIC | Age: 70
End: 2025-07-11
Payer: MEDICARE

## 2025-07-11 VITALS
DIASTOLIC BLOOD PRESSURE: 60 MMHG | HEART RATE: 66 BPM | RESPIRATION RATE: 15 BRPM | HEIGHT: 71 IN | TEMPERATURE: 98.1 F | OXYGEN SATURATION: 97 % | SYSTOLIC BLOOD PRESSURE: 118 MMHG | BODY MASS INDEX: 26.18 KG/M2 | WEIGHT: 187 LBS

## 2025-07-11 PROBLEM — Z00.00 ENCOUNTER FOR WELLNESS EXAMINATION IN ADULT: Status: ACTIVE | Noted: 2025-07-11

## 2025-07-11 PROCEDURE — G0439: CPT

## 2025-07-11 PROCEDURE — 93000 ELECTROCARDIOGRAM COMPLETE: CPT

## 2025-07-11 PROCEDURE — 99213 OFFICE O/P EST LOW 20 MIN: CPT | Mod: 25

## 2025-07-11 RX ORDER — DIAZEPAM 5 MG/1
5 TABLET ORAL TWICE DAILY
Qty: 60 | Refills: 0 | Status: ACTIVE | COMMUNITY
Start: 2025-07-11

## 2025-07-14 ENCOUNTER — RX RENEWAL (OUTPATIENT)
Age: 70
End: 2025-07-14

## 2025-07-26 ENCOUNTER — NON-APPOINTMENT (OUTPATIENT)
Age: 70
End: 2025-07-26

## 2025-07-28 ENCOUNTER — APPOINTMENT (OUTPATIENT)
Dept: CARDIOLOGY | Facility: CLINIC | Age: 70
End: 2025-07-28
Payer: MEDICARE

## 2025-07-28 VITALS
DIASTOLIC BLOOD PRESSURE: 60 MMHG | HEART RATE: 60 BPM | BODY MASS INDEX: 26.6 KG/M2 | WEIGHT: 190 LBS | SYSTOLIC BLOOD PRESSURE: 106 MMHG | HEIGHT: 71 IN | OXYGEN SATURATION: 97 %

## 2025-07-28 DIAGNOSIS — E78.1 PURE HYPERGLYCERIDEMIA: ICD-10-CM

## 2025-07-28 DIAGNOSIS — U07.1 COVID-19: ICD-10-CM

## 2025-07-28 PROCEDURE — 93000 ELECTROCARDIOGRAM COMPLETE: CPT

## 2025-07-28 PROCEDURE — 99204 OFFICE O/P NEW MOD 45 MIN: CPT

## 2025-08-05 ENCOUNTER — APPOINTMENT (OUTPATIENT)
Dept: CARDIOLOGY | Facility: CLINIC | Age: 70
End: 2025-08-05
Payer: MEDICARE

## 2025-08-05 PROCEDURE — 93306 TTE W/DOPPLER COMPLETE: CPT

## 2025-08-05 PROCEDURE — 93015 CV STRESS TEST SUPVJ I&R: CPT

## 2025-08-11 ENCOUNTER — APPOINTMENT (OUTPATIENT)
Dept: CT IMAGING | Facility: CLINIC | Age: 70
End: 2025-08-11
Payer: SELF-PAY

## 2025-08-11 ENCOUNTER — OUTPATIENT (OUTPATIENT)
Dept: OUTPATIENT SERVICES | Facility: HOSPITAL | Age: 70
LOS: 1 days | End: 2025-08-11
Payer: SELF-PAY

## 2025-08-11 DIAGNOSIS — E78.1 PURE HYPERGLYCERIDEMIA: ICD-10-CM

## 2025-08-11 DIAGNOSIS — E78.00 PURE HYPERCHOLESTEROLEMIA, UNSPECIFIED: ICD-10-CM

## 2025-08-11 PROCEDURE — 75571 CT HRT W/O DYE W/CA TEST: CPT

## 2025-08-11 PROCEDURE — 75571 CT HRT W/O DYE W/CA TEST: CPT | Mod: 26

## 2025-08-12 ENCOUNTER — APPOINTMENT (OUTPATIENT)
Dept: INTERNAL MEDICINE | Facility: CLINIC | Age: 70
End: 2025-08-12
Payer: MEDICARE

## 2025-08-12 VITALS
WEIGHT: 190 LBS | DIASTOLIC BLOOD PRESSURE: 70 MMHG | RESPIRATION RATE: 16 BRPM | OXYGEN SATURATION: 96 % | BODY MASS INDEX: 27.2 KG/M2 | HEIGHT: 70 IN | HEART RATE: 59 BPM | TEMPERATURE: 98.1 F | SYSTOLIC BLOOD PRESSURE: 103 MMHG

## 2025-08-12 DIAGNOSIS — J98.01 ACUTE BRONCHOSPASM: ICD-10-CM

## 2025-08-12 DIAGNOSIS — J45.30 MILD PERSISTENT ASTHMA, UNCOMPLICATED: ICD-10-CM

## 2025-08-12 DIAGNOSIS — R09.82 POSTNASAL DRIP: ICD-10-CM

## 2025-08-12 DIAGNOSIS — Z87.891 PERSONAL HISTORY OF NICOTINE DEPENDENCE: ICD-10-CM

## 2025-08-12 PROCEDURE — 94727 GAS DIL/WSHOT DETER LNG VOL: CPT

## 2025-08-12 PROCEDURE — 99214 OFFICE O/P EST MOD 30 MIN: CPT | Mod: 25

## 2025-08-12 PROCEDURE — 94729 DIFFUSING CAPACITY: CPT

## 2025-08-12 PROCEDURE — ZZZZZ: CPT

## 2025-08-12 PROCEDURE — 94060 EVALUATION OF WHEEZING: CPT

## 2025-08-13 ENCOUNTER — APPOINTMENT (OUTPATIENT)
Dept: DERMATOLOGY | Facility: CLINIC | Age: 70
End: 2025-08-13
Payer: MEDICARE

## 2025-08-13 ENCOUNTER — APPOINTMENT (OUTPATIENT)
Dept: CARDIOLOGY | Facility: CLINIC | Age: 70
End: 2025-08-13
Payer: MEDICARE

## 2025-08-13 VITALS
DIASTOLIC BLOOD PRESSURE: 68 MMHG | HEART RATE: 66 BPM | BODY MASS INDEX: 27.2 KG/M2 | WEIGHT: 190 LBS | SYSTOLIC BLOOD PRESSURE: 128 MMHG | HEIGHT: 70 IN | OXYGEN SATURATION: 96 %

## 2025-08-13 DIAGNOSIS — R94.39 ABNORMAL RESULT OF OTHER CARDIOVASCULAR FUNCTION STUDY: ICD-10-CM

## 2025-08-13 DIAGNOSIS — L82.1 OTHER SEBORRHEIC KERATOSIS: ICD-10-CM

## 2025-08-13 DIAGNOSIS — E78.00 PURE HYPERCHOLESTEROLEMIA, UNSPECIFIED: ICD-10-CM

## 2025-08-13 PROCEDURE — G2211 COMPLEX E/M VISIT ADD ON: CPT

## 2025-08-13 PROCEDURE — 99213 OFFICE O/P EST LOW 20 MIN: CPT

## 2025-08-13 PROCEDURE — 99214 OFFICE O/P EST MOD 30 MIN: CPT

## 2025-09-19 ENCOUNTER — APPOINTMENT (OUTPATIENT)
Dept: ORTHOPEDIC SURGERY | Facility: CLINIC | Age: 70
End: 2025-09-19
Payer: MEDICARE

## 2025-09-19 DIAGNOSIS — S93.401A SPRAIN OF UNSPECIFIED LIGAMENT OF RIGHT ANKLE, INITIAL ENCOUNTER: ICD-10-CM

## 2025-09-19 DIAGNOSIS — M25.571 PAIN IN RIGHT ANKLE AND JOINTS OF RIGHT FOOT: ICD-10-CM

## 2025-09-19 DIAGNOSIS — S99.911A UNSPECIFIED INJURY OF RIGHT ANKLE, INITIAL ENCOUNTER: ICD-10-CM

## 2025-09-19 PROCEDURE — 99214 OFFICE O/P EST MOD 30 MIN: CPT

## 2025-09-19 PROCEDURE — 73630 X-RAY EXAM OF FOOT: CPT | Mod: RT

## 2025-09-19 PROCEDURE — 73610 X-RAY EXAM OF ANKLE: CPT | Mod: RT

## 2025-09-25 PROBLEM — R06.2 WHEEZING: Status: ACTIVE | Noted: 2025-09-25
